# Patient Record
Sex: FEMALE | Race: WHITE | NOT HISPANIC OR LATINO | Employment: STUDENT | URBAN - METROPOLITAN AREA
[De-identification: names, ages, dates, MRNs, and addresses within clinical notes are randomized per-mention and may not be internally consistent; named-entity substitution may affect disease eponyms.]

---
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
## 2023-01-27 ENCOUNTER — OFFICE VISIT (OUTPATIENT)
Dept: INTERNAL MEDICINE CLINIC | Facility: CLINIC | Age: 18
End: 2023-01-27

## 2023-01-27 VITALS
SYSTOLIC BLOOD PRESSURE: 110 MMHG | BODY MASS INDEX: 20.59 KG/M2 | HEIGHT: 65 IN | DIASTOLIC BLOOD PRESSURE: 72 MMHG | WEIGHT: 123.6 LBS | OXYGEN SATURATION: 99 % | HEART RATE: 70 BPM | TEMPERATURE: 97.8 F

## 2023-01-27 DIAGNOSIS — F90.9 ADULT ADHD: ICD-10-CM

## 2023-01-27 DIAGNOSIS — F33.1 MODERATE EPISODE OF RECURRENT MAJOR DEPRESSIVE DISORDER (HCC): ICD-10-CM

## 2023-01-27 DIAGNOSIS — F41.1 GAD (GENERALIZED ANXIETY DISORDER): ICD-10-CM

## 2023-01-27 DIAGNOSIS — Z13.220 ENCOUNTER FOR SCREENING FOR LIPOID DISORDERS: ICD-10-CM

## 2023-01-27 DIAGNOSIS — Z00.00 ANNUAL PHYSICAL EXAM: Primary | ICD-10-CM

## 2023-01-27 RX ORDER — DEXTROAMPHETAMINE SACCHARATE, AMPHETAMINE ASPARTATE MONOHYDRATE, DEXTROAMPHETAMINE SULFATE AND AMPHETAMINE SULFATE 1.25; 1.25; 1.25; 1.25 MG/1; MG/1; MG/1; MG/1
5 CAPSULE, EXTENDED RELEASE ORAL EVERY MORNING
Qty: 30 CAPSULE | Refills: 0 | Status: SHIPPED | OUTPATIENT
Start: 2023-01-27

## 2023-01-27 NOTE — ASSESSMENT & PLAN NOTE
• Lifestyle modification, diet and exercise discussed  • Medications discussed and refilled appropriately  • Labs discussed and ordered   • Hepatitis C screening discussed  • HIV screening discussed  • Depression screening performed  • Anxiety screening performed  • BMI discussed  • Cervical cancer screening discussed and ordered

## 2023-01-27 NOTE — PATIENT INSTRUCTIONS
Problem List Items Addressed This Visit          Other    ESTRELLITA (generalized anxiety disorder)    Moderate episode of recurrent major depressive disorder ([facility] [address] )    Annual physical exam - Primary     Lifestyle modification, diet and exercise discussed  Medications discussed and refilled appropriately  Labs discussed and ordered   Hepatitis C screening discussed  HIV screening discussed  Depression screening performed  Anxiety screening performed  BMI discussed  Cervical cancer screening discussed and ordered          Relevant Orders    CBC and differential    Comprehensive metabolic panel    Encounter for screening for lipoid disorders    Relevant Orders    Lipid panel    Adult ADHD     Patient has Based upon the Diagnostic and Statistical Manual of Mental Health Disorders (DSM-5 guide for mental health disorders)    Patient has positive findings as listed on her Adult ADHD-RS-IV with Adult Prompts Document  How to take this test:    Choose from: no/none; mild; moderate; severe  [place] the choice that applies  Once you have taken the test, resubmit the answers to us and we will enter them into your chart  1  He/She has had ________ findings listed under the carelessness topic:  Is making increased mistakes, rushing through work, not checking work, messy workspace  [place]: no/none; mild; moderate; severe  2  He/She has had _________ findings listed under the difficulty sustaining attention in activities topics:    Where he/she has trouble paying attention, able to stay focused on his/her work, takes longer to complete his/her tasks as normal, and has trouble remembering what he/she has read  [place]: no/none; mild; moderate; severe     3  He/She has had _________ findings listed under the does not listen topics:   Others have complained that he/she does not seem to listen or respond when spoken to, he/she needs people to repeat directions, and has noticed that he/she misses key parts of conversations  [place]: no/none; mild; moderate; severe  4  He/She has ________ findings listed under the no follow-through topic:    He/She has trouble finishing things such as work and chores, he/she does leave things half done or finished, he/she has trouble following instructions which are complex and multi steps, and he/she needs to frequently write things down or he/she will forget them  [place]: no/none; mild; moderate; severe  5  He/She has _________ findings listed under the can not organized topic:    He/She has trouble organizing tasks and prioritizing work and chores, he/she does require assistance of others to help plan for him/her, he/she does have trouble with time management, and he/she does procrastinate  [place]: no/none; mild; moderate; severe  6  He/She has _________ findings listed under the avoid/dislikes task requiring sustained mental effort topic:    He/She avoids tasks that are difficult or lengthy, he/she has to force himself/herself to finish these tasks, he/she finds it extremely hard, and he/she continues to procrastinate  [place]: no/none; mild; moderate; severe  7  He/She has __________ findings listed under the lose important items topic:  He/She does frequently lose things, and is always looking for important items  He/She does seem to get in trouble for this  He/She also seems to put items in a safe place that he/she is unable to find later  [place]: no/none; mild; moderate; severe  8  He/She has _______ findings listed under the easily distractible topic:  He/She is easily distracted by other conversations, television, radio  He/She does have to remain in isolation of some sort in order to get any work done  Finds it difficult to get back on track once he/she is off track, and will find other things to do rather than his/her chores or work  [place]: no/none; mild; moderate; severe  9   He/She has _______ findings listed under the forgetful in daily activities topic: He/She forgets his/her daily routine items, and forgets to bring things to and from work on a daily basis  He/She does have to make numerous notes  [place]: no/none; mild; moderate; severe  10  He/She has _________ findings listed under the score was and fidgets topic:  He/She is unable to sit still, and always is fidgeting in his/her chair  He/She does tap and move his/her feet or pen or pencil  He/She does always seem to be fussing either with his/her hair or clothing and he/she states that he/she cannot stop moving it seems automatic  [place]: no/none; mild; moderate; severe  11  He/She has _________ findings listed under the can not stay seated topic:  He/She does have trouble sitting in 1 section/seat for any length of time  He/She states that he/she would rather be up and standing or able to walk rather than sitting  He/She does have to force himself/herself to stay seated  He/She does usually stay away from any requirements where he/she will need to be seated for long periods of time  [place]: no/none; mild; moderate; severe  12  He/She has _________ findings listed under the runs/climbs excessively topic:  He/She was feels physically restless, and does seem more agitated when he/she has made to sit still  [place]: no/none; mild; moderate; severe  13  He/She has severe findings listed under the can not play/work quietly topic:  He/She is unable to sit and read a book, needs to be doing some type of physical activity  [place]: no/none; mild; moderate; severe  14  He/She has _______ findings listed under the on the go, driven by a motor topic:  He/She feels hard to slow down, and that he/she always has a lot of energy and is always on the go  He/She is unable to relax  [place]: no/none; mild; moderate; severe  13  He/She has _______ findings listed under the talks excessively topic:  He/She talks a lot, all the time, definitely more than other people    He/She also finds herself to be much louder than other people  [place]: no/none; mild; moderate; severe  12  He/She has _________ findings listed under the blurts out answers topic:  He/She states that he/she does blurred out answers, is always having a hard time finding the rationale to wait his/her turn  [place]: no/none; mild; moderate; severe  16  He/She has __________ findings listed under the can not wait for turn topic:  Again he/she finds it hard to weight his/her turn, he/she feels frustrated when there are delays or when he/she has to wait in lines  [place]: no/none; mild; moderate; severe  25  He/She has ___________ findings listed under the intrudes/interrupts others topic:  He/She does butt into other people's conversations and seems to interrupt others  [place]: no/none; mild; moderate; severe  This patient was educated on side effects, risks of taking this type of medication which include abuse, misuse, dependence, addiction and tolerance  All questions were answered including different dosing levels, increasing and decreasing of of this medication  We discussed their continued open discussions with the PCP in order to make sure that they are on the correct dose  We reviewed the potential side effects of the medications including, but are not limited to, constipation, diarrhea, nausea/upset stomach, drowsiness, fatigue, dizziness, urinary retention, visual changes, insomnia, headaches, nightmares, slowed breathing while sleeping, UTI issues, impaired judgment, addiction issues and the risk of fatal overdose if not taken as prescribed  We discussed the importance of notifying the office/provider immediately of any side effects  We discussed the importance of immediate notification if there are any feelings of suicidality thoughts or behaviors   We discussed the importance of contacting the office if he has any tachycardia or fainting situations       The patient was warned against driving while taking sedation medications  We discussed that sharing medications is a felony  We discussed other side effects such as QT prolongation, risks of Myocardial Infarction (heart attack), stroke and sudden death  We discussed immediate notification if there is any seizure-like activity  We discussed issues with angioedema as an anaphylactic reactions  I have personally reviewed the [address] [place] [address] ([facility]) website prior to prescribing this medication  The patient understands and agrees to use these medications only as prescribed  At this point in time, the patient is showing no signs of addiction, abuse, diversion or suicidal ideation  All the patient's questions were answered to their satisfaction  [place] Prescription Drug Monitoring Program report was reviewed and was appropriate   All the patient's questions were answered to his/her satisfaction  Answers:  1  Mild  2  Severe  3  Mild  4  Moderate   5  Mild  6  Severe  7  Severe  8  Severe   9  Severe   10  Severe  11  Mild  12  Mild  13  No  14  No  15  No  16  Mild  17  Mild  18   Mild     1/27/2023  Will start her on adderall 5mg and monitor

## 2023-01-27 NOTE — ASSESSMENT & PLAN NOTE
Patient has Based upon the Diagnostic and Statistical Manual of Mental Health Disorders (DSM-5 guide for mental health disorders)    Patient has positive findings as listed on her Adult ADHD-RS-IV with Adult Prompts Document  How to take this test:    Choose from: no/none; mild; moderate; severe  [place] the choice that applies  Once you have taken the test, resubmit the answers to us and we will enter them into your chart  1  He/She has had ________ findings listed under the carelessness topic:  · Is making increased mistakes, rushing through work, not checking work, messy workspace  · [place]: no/none; mild; moderate; severe  2  He/She has had _________ findings listed under the difficulty sustaining attention in activities topics:    · Where he/she has trouble paying attention, able to stay focused on his/her work, takes longer to complete his/her tasks as normal, and has trouble remembering what he/she has read  · [place]: no/none; mild; moderate; severe  3  He/She has had _________ findings listed under the does not listen topics:   · Others have complained that he/she does not seem to listen or respond when spoken to, he/she needs people to repeat directions, and has noticed that he/she misses key parts of conversations  · [place]: no/none; mild; moderate; severe  4  He/She has ________ findings listed under the no follow-through topic:    · He/She has trouble finishing things such as work and chores, he/she does leave things half done or finished, he/she has trouble following instructions which are complex and multi steps, and he/she needs to frequently write things down or he/she will forget them  · [place]: no/none; mild; moderate; severe     5  He/She has _________ findings listed under the can not organized topic:    · He/She has trouble organizing tasks and prioritizing work and chores, he/she does require assistance of others to help plan for him/her, he/she does have trouble with time management, and he/she does procrastinate  · [place]: no/none; mild; moderate; severe  6  He/She has _________ findings listed under the avoid/dislikes task requiring sustained mental effort topic:    · He/She avoids tasks that are difficult or lengthy, he/she has to force himself/herself to finish these tasks, he/she finds it extremely hard, and he/she continues to procrastinate  · [place]: no/none; mild; moderate; severe  7  He/She has __________ findings listed under the lose important items topic:  · He/She does frequently lose things, and is always looking for important items  He/She does seem to get in trouble for this  He/She also seems to put items in a safe place that he/she is unable to find later  · [place]: no/none; mild; moderate; severe  8  He/She has _______ findings listed under the easily distractible topic:  · He/She is easily distracted by other conversations, television, radio  He/She does have to remain in isolation of some sort in order to get any work done  Finds it difficult to get back on track once he/she is off track, and will find other things to do rather than his/her chores or work  · [place]: no/none; mild; moderate; severe  9  He/She has _______ findings listed under the forgetful in daily activities topic:  · He/She forgets his/her daily routine items, and forgets to bring things to and from work on a daily basis  He/She does have to make numerous notes  · [place]: no/none; mild; moderate; severe  10  He/She has _________ findings listed under the score was and fidgets topic:  · He/She is unable to sit still, and always is fidgeting in his/her chair  He/She does tap and move his/her feet or pen or pencil  He/She does always seem to be fussing either with his/her hair or clothing and he/she states that he/she cannot stop moving it seems automatic  · [place]: no/none; mild; moderate; severe     11  He/She has _________ findings listed under the can not stay seated topic:  · He/She does have trouble sitting in 1 section/seat for any length of time  He/She states that he/she would rather be up and standing or able to walk rather than sitting  He/She does have to force himself/herself to stay seated  He/She does usually stay away from any requirements where he/she will need to be seated for long periods of time  · [place]: no/none; mild; moderate; severe  12  He/She has _________ findings listed under the runs/climbs excessively topic:  · He/She was feels physically restless, and does seem more agitated when he/she has made to sit still  · [place]: no/none; mild; moderate; severe  13  He/She has severe findings listed under the can not play/work quietly topic:  · He/She is unable to sit and read a book, needs to be doing some type of physical activity  · [place]: no/none; mild; moderate; severe  14  He/She has _______ findings listed under the on the go, driven by a motor topic:  · He/She feels hard to slow down, and that he/she always has a lot of energy and is always on the go  He/She is unable to relax  · [place]: no/none; mild; moderate; severe  13  He/She has _______ findings listed under the talks excessively topic:  · He/She talks a lot, all the time, definitely more than other people  He/She also finds herself to be much louder than other people  · [place]: no/none; mild; moderate; severe  12  He/She has _________ findings listed under the blurts out answers topic:  · He/She states that he/she does blurred out answers, is always having a hard time finding the rationale to wait his/her turn  · [place]: no/none; mild; moderate; severe  16  He/She has __________ findings listed under the can not wait for turn topic:  · Again he/she finds it hard to weight his/her turn, he/she feels frustrated when there are delays or when he/she has to wait in lines  · [place]: no/none; mild; moderate; severe     18  He/She has ___________ findings listed under the intrudes/interrupts others topic:  · He/She does butt into other people's conversations and seems to interrupt others  · [place]: no/none; mild; moderate; severe  • This patient was educated on side effects, risks of taking this type of medication which include abuse, misuse, dependence, addiction and tolerance  • All questions were answered including different dosing levels, increasing and decreasing of of this medication  • We discussed their continued open discussions with the PCP in order to make sure that they are on the correct dose  • We reviewed the potential side effects of the medications including, but are not limited to, constipation, diarrhea, nausea/upset stomach, drowsiness, fatigue, dizziness, urinary retention, visual changes, insomnia, headaches, nightmares, slowed breathing while sleeping, UTI issues, impaired judgment, addiction issues and the risk of fatal overdose if not taken as prescribed  • We discussed the importance of notifying the office/provider immediately of any side effects  • We discussed the importance of immediate notification if there are any feelings of suicidality thoughts or behaviors   • We discussed the importance of contacting the office if he has any tachycardia or fainting situations  • The patient was warned against driving while taking sedation medications  • We discussed that sharing medications is a felony  • We discussed other side effects such as QT prolongation, risks of Myocardial Infarction (heart attack), stroke and sudden death  • We discussed immediate notification if there is any seizure-like activity  • We discussed issues with angioedema as an anaphylactic reactions  • [facility] website prior to prescribing this medication  • The patient understands and agrees to use these medications only as prescribed   At this point in time, the patient is showing no signs of addiction, abuse, diversion or suicidal ideation  • All the patient's questions were answered to their satisfaction  • [facility] Prescription Drug Monitoring Program report was reviewed and was appropriate   • All the patient's questions were answered to his/her satisfaction  Answers:  1  Mild  2  Severe  3  Mild  4  Moderate   5  Mild  6  Severe  7  Severe  8  Severe   9  Severe   10  Severe  11  Mild  12  Mild  13  No  14  No  15  No  16  Mild  17  Mild  18   Mild     · 1/27/2023  · Will start her on adderall 5mg and monitor

## 2023-01-27 NOTE — PROGRESS NOTES
[id number]    NAME: Ignacia Claros  AGE: 19 y o  SEX: female  : 2005     DATE: 2023     Assessment and Plan:     Problem List Items Addressed This Visit        Other    ESTRELLITA (generalized anxiety disorder)    Moderate episode of recurrent major depressive disorder ([facility] [address] )    Annual physical exam - Primary     • Lifestyle modification, diet and exercise discussed  • Medications discussed and refilled appropriately  • Labs discussed and ordered   • Hepatitis C screening discussed  • HIV screening discussed  • Depression screening performed  • Anxiety screening performed  • BMI discussed  • Cervical cancer screening discussed and ordered          Relevant Orders    CBC and differential    Comprehensive metabolic panel    Encounter for screening for lipoid disorders    Relevant Orders    Lipid panel    Adult ADHD     Patient has Based upon the Diagnostic and Statistical Manual of Mental Health Disorders (DSM-5 guide for mental health disorders)    Patient has positive findings as listed on her Adult ADHD-RS-IV with Adult Prompts Document  How to take this test:    Choose from: no/none; mild; moderate; severe  [place] the choice that applies  Once you have taken the test, resubmit the answers to us and we will enter them into your chart  1  He/She has had ________ findings listed under the carelessness topic:  · Is making increased mistakes, rushing through work, not checking work, messy workspace  · [place]: no/none; mild; moderate; severe  2  He/She has had _________ findings listed under the difficulty sustaining attention in activities topics:    · Where he/she has trouble paying attention, able to stay focused on his/her work, takes longer to complete his/her tasks as normal, and has trouble remembering what he/she has read  · [place]: no/none; mild; moderate; severe     3  He/She has had _________ findings listed under the does not listen topics:   · Others have complained that he/she does not seem to listen or respond when spoken to, he/she needs people to repeat directions, and has noticed that he/she misses key parts of conversations  · [place]: no/none; mild; moderate; severe  4  He/She has ________ findings listed under the no follow-through topic:    · He/She has trouble finishing things such as work and chores, he/she does leave things half done or finished, he/she has trouble following instructions which are complex and multi steps, and he/she needs to frequently write things down or he/she will forget them  · [place]: no/none; mild; moderate; severe  5  He/She has _________ findings listed under the can not organized topic:    · He/She has trouble organizing tasks and prioritizing work and chores, he/she does require assistance of others to help plan for him/her, he/she does have trouble with time management, and he/she does procrastinate  · [place]: no/none; mild; moderate; severe  6  He/She has _________ findings listed under the avoid/dislikes task requiring sustained mental effort topic:    · He/She avoids tasks that are difficult or lengthy, he/she has to force himself/herself to finish these tasks, he/she finds it extremely hard, and he/she continues to procrastinate  · [place]: no/none; mild; moderate; severe  7  He/She has __________ findings listed under the lose important items topic:  · He/She does frequently lose things, and is always looking for important items  He/She does seem to get in trouble for this  He/She also seems to put items in a safe place that he/she is unable to find later  · [place]: no/none; mild; moderate; severe  8  He/She has _______ findings listed under the easily distractible topic:  · He/She is easily distracted by other conversations, television, radio  He/She does have to remain in isolation of some sort in order to get any work done    Finds it difficult to get back on track once he/she is off track, and will find other things to do rather than his/her chores or work  · [place]: no/none; mild; moderate; severe  9  He/She has _______ findings listed under the forgetful in daily activities topic:  · He/She forgets his/her daily routine items, and forgets to bring things to and from work on a daily basis  He/She does have to make numerous notes  · [place]: no/none; mild; moderate; severe  10  He/She has _________ findings listed under the score was and fidgets topic:  · He/She is unable to sit still, and always is fidgeting in his/her chair  He/She does tap and move his/her feet or pen or pencil  He/She does always seem to be fussing either with his/her hair or clothing and he/she states that he/she cannot stop moving it seems automatic  · [place]: no/none; mild; moderate; severe  11  He/She has _________ findings listed under the can not stay seated topic:  · He/She does have trouble sitting in 1 section/seat for any length of time  He/She states that he/she would rather be up and standing or able to walk rather than sitting  He/She does have to force himself/herself to stay seated  He/She does usually stay away from any requirements where he/she will need to be seated for long periods of time  · [place]: no/none; mild; moderate; severe  12  He/She has _________ findings listed under the runs/climbs excessively topic:  · He/She was feels physically restless, and does seem more agitated when he/she has made to sit still  · [place]: no/none; mild; moderate; severe  13  He/She has severe findings listed under the can not play/work quietly topic:  · He/She is unable to sit and read a book, needs to be doing some type of physical activity  · [place]: no/none; mild; moderate; severe     14  He/She has _______ findings listed under the on the go, driven by a motor topic:  · He/She feels hard to slow down, and that he/she always has a lot of energy and is always on the go  He/She is unable to relax  · [place]: no/none; mild; moderate; severe  13  He/She has _______ findings listed under the talks excessively topic:  · He/She talks a lot, all the time, definitely more than other people  He/She also finds herself to be much louder than other people  · [place]: no/none; mild; moderate; severe  12  He/She has _________ findings listed under the blurts out answers topic:  · He/She states that he/she does blurred out answers, is always having a hard time finding the rationale to wait his/her turn  · [place]: no/none; mild; moderate; severe  16  He/She has __________ findings listed under the can not wait for turn topic:  · Again he/she finds it hard to weight his/her turn, he/she feels frustrated when there are delays or when he/she has to wait in lines  · [place]: no/none; mild; moderate; severe  25  He/She has ___________ findings listed under the intrudes/interrupts others topic:  · He/She does butt into other people's conversations and seems to interrupt others  · [place]: no/none; mild; moderate; severe  • This patient was educated on side effects, risks of taking this type of medication which include abuse, misuse, dependence, addiction and tolerance  • All questions were answered including different dosing levels, increasing and decreasing of of this medication  • We discussed their continued open discussions with the PCP in order to make sure that they are on the correct dose  • We reviewed the potential side effects of the medications including, but are not limited to, constipation, diarrhea, nausea/upset stomach, drowsiness, fatigue, dizziness, urinary retention, visual changes, insomnia, headaches, nightmares, slowed breathing while sleeping, UTI issues, impaired judgment, addiction issues and the risk of fatal overdose if not taken as prescribed  • We discussed the importance of notifying the office/provider immediately of any side effects  • We discussed the importance of immediate notification if there are any feelings of suicidality thoughts or behaviors   • We discussed the importance of contacting the office if he has any tachycardia or fainting situations  • The patient was warned against driving while taking sedation medications  • We discussed that sharing medications is a felony  • We discussed other side effects such as QT prolongation, risks of Myocardial Infarction (heart attack), stroke and sudden death  • We discussed immediate notification if there is any seizure-like activity  • We discussed issues with angioedema as an anaphylactic reactions  • I have personally reviewed the [facility] [place] [address] ([facility]) website prior to prescribing this medication  • The patient understands and agrees to use these medications only as prescribed  At this point in time, the patient is showing no signs of addiction, abuse, diversion or suicidal ideation  • All the patient's questions were answered to their satisfaction  • [facility] Prescription Drug Monitoring Program report was reviewed and was appropriate   • All the patient's questions were answered to his/her satisfaction  Answers:  1  Mild  2  Severe  3  Mild  4  Moderate   5  Mild  6  Severe  7  Severe  8  Severe   9  Severe   10  Severe  11  Mild  12  Mild  13  No  14  No  15  No  16  Mild  17  Mild  18  Mild     · 1/27/2023  · Will start her on adderall 5mg and monitor               Immunizations and preventive care screenings were discussed with patient today  Appropriate education was printed on patient's after visit summary  Counseling:  Alcohol/drug use: discussed moderation in alcohol intake, the recommendations for healthy alcohol use, and avoidance of illicit drug use  Dental Health: discussed importance of regular tooth brushing, flossing, and dental visits    Injury prevention: discussed safety/seat belts, safety helmets, smoke detectors, carbon dioxide detectors, and smoking near bedding or upholstery  Sexual health: discussed sexually transmitted diseases, partner selection, use of condoms, avoidance of unintended pregnancy, and contraceptive alternatives  · Exercise: the importance of regular exercise/physical activity was discussed  Recommend exercise 3-5 times per week for at least 30 minutes  Return in about 4 weeks (around 2023) for Recheck virtual       Chief Complaint:     Chief Complaint   Patient presents with   • Annual Exam     New pt, c/o having severe anxiety and depression  RTN labs, DS         History of Present Illness:     Adult Annual Physical   Patient here for a comprehensive physical exam  The patient reports problems - as discussed   Diet and Physical Activity  · Diet/Nutrition: well balanced diet  · Exercise: 3-4 times a week on average  Depression Screening  PHQ-2/9 Depression Screening    Little interest or pleasure in doing things: 1 - several days  Feeling down, depressed, or hopeless: 1 - several days  Trouble falling or staying asleep, or sleeping too much: 1 - several days  Feeling tired or having little energy: 2 - more than half the days  Poor appetite or overeatin - several days  Feeling bad about yourself - or that you are a failure or have let yourself or your family down: 0 - not at all  Trouble concentrating on things, such as reading the newspaper or watching television: 1 - several days  Moving or speaking so slowly that other people could have noticed  Or the opposite - being so fidgety or restless that you have been moving around a lot more than usual: 2 - more than half the days  Thoughts that you would be better off dead, or of hurting yourself in some way: 0 - not at all  PHQ-9 Score: 9   PHQ-9 Interpretation: Mild depression        General Health  · Sleep: sleeps well  · Hearing: normal - bilateral   · Vision: no vision problems  · Dental: regular dental visits  /GYN Health  · Last menstrual period:   · Contraceptive method:      · History of STDs?: no      Review of Systems:     Review of Systems   Constitutional: Negative for activity change, chills, fatigue and fever  HENT: Negative for rhinorrhea and sore throat  Eyes: Negative for pain  Respiratory: Negative for cough and shortness of breath  Cardiovascular: Negative for chest pain, palpitations and leg swelling  Gastrointestinal: Negative for abdominal pain, constipation, diarrhea, nausea and vomiting  Genitourinary: Negative for difficulty urinating, flank pain, frequency and urgency  Musculoskeletal: Negative for gait problem, joint swelling and myalgias  Skin: Negative for color change  Neurological: Negative for dizziness, weakness, light-headedness and headaches  Psychiatric/Behavioral: Positive for decreased concentration  Negative for sleep disturbance  The patient is nervous/anxious  All other systems reviewed and are negative       Past Medical History:     Past Medical History:   Diagnosis Date   • Anxiety    • Depression       Past Surgical History:     Past Surgical History:   Procedure Laterality Date   • MOUTH SURGERY        Social History:     Social History     Socioeconomic History   • Marital status: Single     Spouse name: None   • Number of children: None   • Years of education: None   • Highest education level: None   Occupational History   • Occupation: student   Tobacco Use   • Smoking status: Never   • Smokeless tobacco: Never   Vaping Use   • Vaping Use: Never used   Substance and Sexual Activity   • Alcohol use: Never   • Drug use: Never   • Sexual activity: None   Other Topics Concern   • None   Social History Narrative   • None     Social Determinants of Health     Financial Resource Strain: Not on file   Food Insecurity: Not on file   Transportation Needs: Not on file   Physical Activity: Not on file   Stress: Not on file   Social Connections: Not on file Intimate Partner Violence: Not on file   Housing Stability: Not on file      Family History:     Family History   Problem Relation Age of Onset   • No Known Problems Mother    • No Known Problems Father       Current Medications:     No current outpatient medications on file  No current facility-administered medications for this visit  Allergies:     No Known Allergies   Physical Exam:     /72 (BP Location: Left arm, Patient Position: Sitting, Cuff Size: Standard)   Pulse 70   Temp 97 8 °F (36 6 °C)   Ht 5' 4 5" (1 638 m)   Wt 56 1 kg (123 lb 9 6 oz)   SpO2 99%   BMI 20 89 kg/m²     Physical Exam  Vitals and nursing note reviewed  Constitutional:       General: She is awake  She is not in acute distress  Appearance: Normal appearance  She is well-developed and overweight  HENT:      Head: Normocephalic and atraumatic  Nose: Nose normal       Mouth/Throat:      Mouth: Mucous membranes are moist    Eyes:      Conjunctiva/sclera: Conjunctivae normal    Cardiovascular:      Rate and Rhythm: Normal rate and regular rhythm  Pulses: Normal pulses  Heart sounds: Normal heart sounds  No murmur heard  Pulmonary:      Effort: Pulmonary effort is normal  No respiratory distress  Breath sounds: Normal breath sounds  Abdominal:      General: Bowel sounds are normal       Palpations: Abdomen is soft  Tenderness: There is no abdominal tenderness  Musculoskeletal:      Cervical back: Neck supple  Right lower leg: No edema  Left lower leg: No edema  Skin:     General: Skin is warm and dry  Neurological:      Mental Status: She is alert and oriented to person, place, and time  Psychiatric:         Attention and Perception: Attention normal          Mood and Affect: Mood is anxious and depressed  Speech: Speech normal          Behavior: Behavior normal  Behavior is cooperative        Comments: Decreased concentration           RITESH Leiva [id number]

## 2023-02-27 ENCOUNTER — TELEMEDICINE (OUTPATIENT)
Dept: INTERNAL MEDICINE CLINIC | Facility: CLINIC | Age: 18
End: 2023-02-27

## 2023-02-27 DIAGNOSIS — F90.9 ADULT ADHD: Primary | ICD-10-CM

## 2023-02-27 RX ORDER — DEXTROAMPHETAMINE SACCHARATE, AMPHETAMINE ASPARTATE MONOHYDRATE, DEXTROAMPHETAMINE SULFATE AND AMPHETAMINE SULFATE 2.5; 2.5; 2.5; 2.5 MG/1; MG/1; MG/1; MG/1
10 CAPSULE, EXTENDED RELEASE ORAL EVERY MORNING
Qty: 30 CAPSULE | Refills: 0 | Status: SHIPPED | OUTPATIENT
Start: 2023-02-27

## 2023-02-27 NOTE — PATIENT INSTRUCTIONS
Problem List Items Addressed This Visit          Other    Adult ADHD - Primary     1/27/2023  Will start her on adderall 5mg and monitor     2/27/2023  Patient started out doing well on adderall 5mg  She noticed about a week after taking the medication, that she was not able to focus as well as she had been  Will increase her dose to 10mg and monitor her     She also states that her anxiety has improved         Relevant Medications    amphetamine-dextroamphetamine (ADDERALL XR, 10MG,) 10 MG 24 hr capsule

## 2023-02-27 NOTE — PROGRESS NOTES
Virtual Regular Visit    Verification of patient location:    Patient is located in the following state in which I hold an active license [place]      Assessment/Plan:    Problem List Items Addressed This Visit        Other    Adult ADHD - Primary     • 1/27/2023  • Will start her on adderall 5mg and monitor     · 2/27/2023  · Patient started out doing well on adderall 5mg  · She noticed about a week after taking the medication, that she was not able to focus as well as she had been  · Will increase her dose to 10mg and monitor her  · She also states that her anxiety has improved         Relevant Medications    amphetamine-dextroamphetamine (ADDERALL XR, 10MG,) 10 MG 24 hr capsule            Reason for visit is   Chief Complaint   Patient presents with   • Follow-up     4 weeks f/u  on anxiety, pt has been on her meds and states she can tell the different, thinks an increase in the mg would be better         Encounter provider RITESH Tai    Provider located at [address] [id number]      Recent Visits  No visits were found meeting these conditions  Showing recent visits within past 7 days and meeting all other requirements  Today's Visits  Date Type Provider Dept   02/27/23 Telemedicine RITESH Tai  [place] today's visits and meeting all other requirements  Future Appointments  No visits were found meeting these conditions  Showing future appointments within next 150 days and meeting all other requirements       The patient was identified by name and date of birth  Abena Orantes was informed that this is a telemedicine visit and that the visit is being conducted through the [address] [place] [address] Now platform  She agrees to proceed     My office door was closed  No one else was in the room  She acknowledged consent and understanding of privacy and security of the video platform   The patient has agreed to participate and understands they can discontinue the visit at any time  Patient is aware this is a billable service  Subjective  Mary Sarkar is a 19 y o  female   The patient is here today to discuss her ADHD and her medications  Please continue to the [facility] section of the note for details of today's visit  Past Medical History:   Diagnosis Date   • Anxiety    • Depression        Past Surgical History:   Procedure Laterality Date   • MOUTH SURGERY         Current Outpatient Medications   Medication Sig Dispense Refill   • amphetamine-dextroamphetamine (ADDERALL XR, 10MG,) 10 MG 24 hr capsule Take 1 capsule (10 mg total) by mouth every morning Max Daily Amount: 10 mg 30 capsule 0   • amphetamine-dextroamphetamine (ADDERALL XR, 5MG,) 5 MG 24 hr capsule Take 1 capsule (5 mg total) by mouth every morning Max Daily Amount: 5 mg 30 capsule 0     No current facility-administered medications for this visit  No Known Allergies    Review of Systems   Constitutional: Negative for activity change, appetite change, chills, fatigue and fever  HENT: Negative for congestion, ear pain, postnasal drip, rhinorrhea, sinus pressure, sinus pain, sore throat and trouble swallowing  Eyes: Negative for pain  Respiratory: Negative for cough, chest tightness and shortness of breath  Cardiovascular: Negative for chest pain, palpitations and leg swelling  Gastrointestinal: Negative for abdominal pain, constipation, diarrhea, nausea and vomiting  Genitourinary: Negative for difficulty urinating, flank pain, frequency and urgency  Musculoskeletal: Negative for back pain, joint swelling and myalgias  Skin: Negative for color change  Neurological: Negative for dizziness, weakness, light-headedness and headaches  Psychiatric/Behavioral: Positive for decreased concentration  Negative for sleep disturbance  The patient is nervous/anxious          Video Exam    Vitals:       Physical Exam  Constitutional: General: She is awake  Appearance: Normal appearance  She is well-developed  HENT:      Head: Normocephalic  Nose: Nose normal       Mouth/Throat:      Mouth: Mucous membranes are moist    Eyes:      Extraocular Movements: Extraocular movements intact  Cardiovascular:      Rate and Rhythm: Normal rate  Pulmonary:      Effort: Pulmonary effort is normal    Abdominal:      Palpations: Abdomen is soft  Musculoskeletal:         General: Normal range of motion  Cervical back: Normal range of motion  Skin:     General: Skin is warm and dry  Neurological:      Mental Status: She is alert and oriented to person, place, and time  Psychiatric:         Attention and Perception: Attention normal          Mood and Affect: Mood is anxious  Speech: Speech normal          Behavior: Behavior normal  Behavior is cooperative  Comments: Improved concentration           I spent 15 minutes with patient today in which greater than 50% of the time was spent in counseling/coordination of care regarding medications, treatment plan, and follow up care

## 2023-02-27 NOTE — ASSESSMENT & PLAN NOTE
• 1/27/2023  • Will start her on adderall 5mg and monitor     · 2/27/2023  · Patient started out doing well on adderall 5mg  · She noticed about a week after taking the medication, that she was not able to focus as well as she had been  · Will increase her dose to 10mg and monitor her     · She also states that her anxiety has improved

## 2023-03-28 PROBLEM — Z13.220 ENCOUNTER FOR SCREENING FOR LIPOID DISORDERS: Status: RESOLVED | Noted: 2023-01-27 | Resolved: 2023-03-28

## 2023-03-30 DIAGNOSIS — F90.9 ADULT ADHD: ICD-10-CM

## 2023-03-31 RX ORDER — DEXTROAMPHETAMINE SACCHARATE, AMPHETAMINE ASPARTATE MONOHYDRATE, DEXTROAMPHETAMINE SULFATE AND AMPHETAMINE SULFATE 2.5; 2.5; 2.5; 2.5 MG/1; MG/1; MG/1; MG/1
CAPSULE, EXTENDED RELEASE ORAL
Qty: 30 CAPSULE | Refills: 0 | Status: SHIPPED | OUTPATIENT
Start: 2023-03-31

## 2023-05-10 DIAGNOSIS — F90.9 ADULT ADHD: ICD-10-CM

## 2023-05-10 RX ORDER — DEXTROAMPHETAMINE SACCHARATE, AMPHETAMINE ASPARTATE MONOHYDRATE, DEXTROAMPHETAMINE SULFATE AND AMPHETAMINE SULFATE 2.5; 2.5; 2.5; 2.5 MG/1; MG/1; MG/1; MG/1
CAPSULE, EXTENDED RELEASE ORAL
Qty: 30 CAPSULE | Refills: 0 | Status: SHIPPED | OUTPATIENT
Start: 2023-05-10

## 2023-08-17 DIAGNOSIS — F90.9 ADULT ADHD: ICD-10-CM

## 2023-08-17 RX ORDER — DEXTROAMPHETAMINE SACCHARATE, AMPHETAMINE ASPARTATE MONOHYDRATE, DEXTROAMPHETAMINE SULFATE AND AMPHETAMINE SULFATE 2.5; 2.5; 2.5; 2.5 MG/1; MG/1; MG/1; MG/1
10 CAPSULE, EXTENDED RELEASE ORAL EVERY MORNING
Qty: 30 CAPSULE | Refills: 0 | OUTPATIENT
Start: 2023-08-17

## 2023-08-17 RX ORDER — DEXTROAMPHETAMINE SACCHARATE, AMPHETAMINE ASPARTATE MONOHYDRATE, DEXTROAMPHETAMINE SULFATE AND AMPHETAMINE SULFATE 2.5; 2.5; 2.5; 2.5 MG/1; MG/1; MG/1; MG/1
CAPSULE, EXTENDED RELEASE ORAL
Qty: 30 CAPSULE | Refills: 0 | Status: SHIPPED | OUTPATIENT
Start: 2023-08-17

## 2023-08-17 NOTE — TELEPHONE ENCOUNTER
Requested medication(s) are due for refill today: No  Patient has already received a courtesy refill: Yes  Other reason request has been forwarded to provider: Sloane Adams

## 2023-09-20 DIAGNOSIS — F90.9 ADULT ADHD: ICD-10-CM

## 2023-09-21 RX ORDER — DEXTROAMPHETAMINE SACCHARATE, AMPHETAMINE ASPARTATE MONOHYDRATE, DEXTROAMPHETAMINE SULFATE AND AMPHETAMINE SULFATE 2.5; 2.5; 2.5; 2.5 MG/1; MG/1; MG/1; MG/1
10 CAPSULE, EXTENDED RELEASE ORAL EVERY MORNING
Qty: 30 CAPSULE | Refills: 0 | Status: SHIPPED | OUTPATIENT
Start: 2023-09-21

## 2023-11-06 DIAGNOSIS — F90.9 ADULT ADHD: ICD-10-CM

## 2023-11-07 RX ORDER — DEXTROAMPHETAMINE SACCHARATE, AMPHETAMINE ASPARTATE MONOHYDRATE, DEXTROAMPHETAMINE SULFATE AND AMPHETAMINE SULFATE 2.5; 2.5; 2.5; 2.5 MG/1; MG/1; MG/1; MG/1
10 CAPSULE, EXTENDED RELEASE ORAL EVERY MORNING
Qty: 30 CAPSULE | Refills: 0 | Status: SHIPPED | OUTPATIENT
Start: 2023-11-07

## 2024-01-06 DIAGNOSIS — F90.9 ADULT ADHD: ICD-10-CM

## 2024-01-08 RX ORDER — DEXTROAMPHETAMINE SACCHARATE, AMPHETAMINE ASPARTATE MONOHYDRATE, DEXTROAMPHETAMINE SULFATE AND AMPHETAMINE SULFATE 2.5; 2.5; 2.5; 2.5 MG/1; MG/1; MG/1; MG/1
10 CAPSULE, EXTENDED RELEASE ORAL EVERY MORNING
Qty: 30 CAPSULE | Refills: 0 | Status: SHIPPED | OUTPATIENT
Start: 2024-01-08

## 2024-02-16 DIAGNOSIS — F90.9 ADULT ADHD: ICD-10-CM

## 2024-02-19 RX ORDER — DEXTROAMPHETAMINE SACCHARATE, AMPHETAMINE ASPARTATE MONOHYDRATE, DEXTROAMPHETAMINE SULFATE AND AMPHETAMINE SULFATE 2.5; 2.5; 2.5; 2.5 MG/1; MG/1; MG/1; MG/1
10 CAPSULE, EXTENDED RELEASE ORAL EVERY MORNING
Qty: 30 CAPSULE | Refills: 0 | Status: SHIPPED | OUTPATIENT
Start: 2024-02-19

## 2024-03-21 DIAGNOSIS — F90.9 ADULT ADHD: ICD-10-CM

## 2024-03-21 RX ORDER — DEXTROAMPHETAMINE SACCHARATE, AMPHETAMINE ASPARTATE MONOHYDRATE, DEXTROAMPHETAMINE SULFATE AND AMPHETAMINE SULFATE 2.5; 2.5; 2.5; 2.5 MG/1; MG/1; MG/1; MG/1
10 CAPSULE, EXTENDED RELEASE ORAL EVERY MORNING
Qty: 30 CAPSULE | Refills: 0 | Status: SHIPPED | OUTPATIENT
Start: 2024-03-21

## 2024-05-08 ENCOUNTER — LAB (OUTPATIENT)
Dept: LAB | Facility: CLINIC | Age: 19
End: 2024-05-08
Payer: COMMERCIAL

## 2024-05-08 ENCOUNTER — OFFICE VISIT (OUTPATIENT)
Dept: INTERNAL MEDICINE CLINIC | Facility: CLINIC | Age: 19
End: 2024-05-08
Payer: COMMERCIAL

## 2024-05-08 VITALS
OXYGEN SATURATION: 97 % | SYSTOLIC BLOOD PRESSURE: 112 MMHG | BODY MASS INDEX: 22.16 KG/M2 | HEART RATE: 75 BPM | WEIGHT: 133 LBS | HEIGHT: 65 IN | TEMPERATURE: 97.5 F | DIASTOLIC BLOOD PRESSURE: 68 MMHG

## 2024-05-08 DIAGNOSIS — Z13.1 SCREENING FOR DIABETES MELLITUS: ICD-10-CM

## 2024-05-08 DIAGNOSIS — Z23 ENCOUNTER FOR IMMUNIZATION: ICD-10-CM

## 2024-05-08 DIAGNOSIS — Z12.4 SCREENING FOR CERVICAL CANCER: ICD-10-CM

## 2024-05-08 DIAGNOSIS — Z51.81 ENCOUNTER FOR THERAPEUTIC DRUG LEVEL MONITORING: ICD-10-CM

## 2024-05-08 DIAGNOSIS — F90.9 ADULT ADHD: Chronic | ICD-10-CM

## 2024-05-08 DIAGNOSIS — Z00.00 ANNUAL PHYSICAL EXAM: ICD-10-CM

## 2024-05-08 DIAGNOSIS — Z13.220 SCREENING FOR HYPERLIPIDEMIA: ICD-10-CM

## 2024-05-08 DIAGNOSIS — E55.9 VITAMIN D DEFICIENCY: ICD-10-CM

## 2024-05-08 DIAGNOSIS — Z00.00 ANNUAL PHYSICAL EXAM: Primary | ICD-10-CM

## 2024-05-08 LAB
25(OH)D3 SERPL-MCNC: 25.1 NG/ML (ref 30–100)
ALBUMIN SERPL BCP-MCNC: 4.3 G/DL (ref 3.5–5)
ALP SERPL-CCNC: 87 U/L (ref 34–104)
ALT SERPL W P-5'-P-CCNC: 19 U/L (ref 7–52)
ANION GAP SERPL CALCULATED.3IONS-SCNC: 7 MMOL/L (ref 4–13)
AST SERPL W P-5'-P-CCNC: 19 U/L (ref 13–39)
BILIRUB SERPL-MCNC: 0.37 MG/DL (ref 0.2–1)
BUN SERPL-MCNC: 8 MG/DL (ref 5–25)
CALCIUM SERPL-MCNC: 8.7 MG/DL (ref 8.4–10.2)
CHLORIDE SERPL-SCNC: 104 MMOL/L (ref 96–108)
CHOLEST SERPL-MCNC: 130 MG/DL
CO2 SERPL-SCNC: 30 MMOL/L (ref 21–32)
CREAT SERPL-MCNC: 0.61 MG/DL (ref 0.6–1.3)
ERYTHROCYTE [DISTWIDTH] IN BLOOD BY AUTOMATED COUNT: 12.4 % (ref 11.6–15.1)
EST. AVERAGE GLUCOSE BLD GHB EST-MCNC: 117 MG/DL
GFR SERPL CREATININE-BSD FRML MDRD: 130 ML/MIN/1.73SQ M
GLUCOSE P FAST SERPL-MCNC: 92 MG/DL (ref 65–99)
HBA1C MFR BLD: 5.7 %
HCT VFR BLD AUTO: 42.2 % (ref 34.8–46.1)
HDLC SERPL-MCNC: 71 MG/DL
HGB BLD-MCNC: 13.2 G/DL (ref 11.5–15.4)
LDLC SERPL CALC-MCNC: 44 MG/DL (ref 0–100)
MCH RBC QN AUTO: 27.3 PG (ref 26.8–34.3)
MCHC RBC AUTO-ENTMCNC: 31.3 G/DL (ref 31.4–37.4)
MCV RBC AUTO: 87 FL (ref 82–98)
NONHDLC SERPL-MCNC: 59 MG/DL
PLATELET # BLD AUTO: 320 THOUSANDS/UL (ref 149–390)
PMV BLD AUTO: 9.3 FL (ref 8.9–12.7)
POTASSIUM SERPL-SCNC: 4.4 MMOL/L (ref 3.5–5.3)
PROT SERPL-MCNC: 6.8 G/DL (ref 6.4–8.4)
RBC # BLD AUTO: 4.83 MILLION/UL (ref 3.81–5.12)
SODIUM SERPL-SCNC: 141 MMOL/L (ref 135–147)
TRIGL SERPL-MCNC: 76 MG/DL
WBC # BLD AUTO: 5.31 THOUSAND/UL (ref 4.31–10.16)

## 2024-05-08 PROCEDURE — 80061 LIPID PANEL: CPT

## 2024-05-08 PROCEDURE — 83036 HEMOGLOBIN GLYCOSYLATED A1C: CPT

## 2024-05-08 PROCEDURE — 82306 VITAMIN D 25 HYDROXY: CPT

## 2024-05-08 PROCEDURE — 85027 COMPLETE CBC AUTOMATED: CPT

## 2024-05-08 PROCEDURE — 99395 PREV VISIT EST AGE 18-39: CPT | Performed by: NURSE PRACTITIONER

## 2024-05-08 PROCEDURE — 36415 COLL VENOUS BLD VENIPUNCTURE: CPT

## 2024-05-08 PROCEDURE — 80053 COMPREHEN METABOLIC PANEL: CPT

## 2024-05-08 RX ORDER — DEXTROAMPHETAMINE SACCHARATE, AMPHETAMINE ASPARTATE MONOHYDRATE, DEXTROAMPHETAMINE SULFATE AND AMPHETAMINE SULFATE 3.75; 3.75; 3.75; 3.75 MG/1; MG/1; MG/1; MG/1
15 CAPSULE, EXTENDED RELEASE ORAL EVERY MORNING
Qty: 30 CAPSULE | Refills: 0 | Status: SHIPPED | OUTPATIENT
Start: 2024-05-08

## 2024-05-08 RX ORDER — DEXTROAMPHETAMINE SACCHARATE, AMPHETAMINE ASPARTATE, DEXTROAMPHETAMINE SULFATE AND AMPHETAMINE SULFATE 1.25; 1.25; 1.25; 1.25 MG/1; MG/1; MG/1; MG/1
5 TABLET ORAL 2 TIMES DAILY
Qty: 60 TABLET | Refills: 0 | Status: SHIPPED | OUTPATIENT
Start: 2024-05-08

## 2024-05-08 NOTE — PROGRESS NOTES
ADULT ANNUAL PHYSICAL  [facility]    NAME: Karina Salazar  AGE: 19 y.o. SEX: female  : 2005     DATE: 2024     Assessment and Plan:     Problem List Items Addressed This Visit          Behavioral Health    Adult ADHD (Chronic)     This is a chronic and stable disease process.   Continue both Adderall medications  Adderall XR 15mg daily   AND  Adderall 5mg twice a day     Patient has Based upon the Diagnostic and Statistical Manual of Mental Health Disorders (DSM-5 guide for mental health disorders)    Patient has positive findings as listed on the Adult ADHD-RS-IV with Adult Prompts Document.    The patient has completed the controlled substance paperwork.     This patient was educated on side effects, risks of taking this type of medication which include abuse, misuse, dependence, addiction and tolerance.    All questions were answered including different dosing levels, increasing and decreasing of of this medication.  We discussed their continued open discussions with the PCP in order to make sure that they are on the correct dose.     We reviewed the potential side effects of the medications including, but are not limited to, constipation, diarrhea, nausea/upset stomach, drowsiness, fatigue, dizziness, urinary retention, visual changes, insomnia, headaches, nightmares, slowed breathing while sleeping, UTI issues, impaired judgment, addiction issues and the risk of fatal overdose if not taken as prescribed.   We discussed the importance of notifying the office/provider immediately of any side effects.   We discussed the importance of immediate notification if there are any feelings of suicidality thoughts or behaviors   We discussed the importance of contacting the office if he has any tachycardia or fainting situations.     The patient was warned against driving while taking sedation medications.    We discussed that sharing medications is a  fady.   We discussed other side effects such as QT prolongation, risks of Myocardial Infarction (heart attack), stroke and sudden death.   We discussed immediate notification if there is any seizure-like activity.    We discussed issues with angioedema as an anaphylactic reactions.    Patient agrees to provide a Urine Sample for Toxicology purposes.  I have personally reviewed the [place] Drug Monitoring Program (PDMP) website prior to prescribing this medication.  The patient understands and agrees to use these medications only as prescribed. At this point in time, the patient is showing no signs of addiction, abuse, diversion or suicidal ideation.  All the patient's questions were answered to their satisfaction.  [place] Prescription Drug Monitoring Program report was reviewed and was appropriate       I have personally reviewed the [place] Drug Monitoring Program (PDMP) website prior to prescribing this medication.  The patient understands and agrees to use these medications only as prescribed.   At this point in time, the patient is showing no signs of addiction, abuse, diversion or suicidal ideation.  The patient's use has been found to be appropriate without any concerns for misuse by the patient.   The patient's current conditions require continued scheduled medication use at this time.   Future review for continued appropriate medication use and misuse will continue.    All the patient's questions were answered to their satisfaction.  [place] Prescription Drug Monitoring Program report was reviewed and was appropriate       This patient has a Toxicology test scheduled for his/her appointment.          Relevant Medications    amphetamine-dextroamphetamine (ADDERALL XR, 15MG,) 15 MG 24 hr capsule    amphetamine-dextroamphetamine (ADDERALL, 5MG,) 5 MG tablet    Other Relevant Orders    Urine Drug Screen    Methylphenidate    Fentanyl with Confirmation    Buprenorphine w/ Naloxone     Naltrexone    Gabapentin    Pregabalin    Synthetic Stimulants    [facility] Prescribed Medications       Obstetrics/Gynecology    Screening for cervical cancer    Relevant Orders    Ambulatory Referral to Obstetrics / Gynecology    Ambulatory Referral to Obstetrics / Gynecology       Other    Annual physical exam - Primary    Relevant Orders    CBC and Platelet    Comprehensive metabolic panel    CBC and Platelet    Comprehensive metabolic panel    Screening for hyperlipidemia    Relevant Orders    Lipid panel    Lipid panel    Screening for diabetes mellitus    Relevant Orders    Hemoglobin A1C    Hemoglobin A1C    Encounter for therapeutic drug level monitoring    Relevant Orders    Urine Drug Screen    Methylphenidate    Fentanyl with Confirmation    Buprenorphine w/ Naloxone    Naltrexone    Gabapentin    Pregabalin    Synthetic Stimulants    [facility] Prescribed Medications    Encounter for immunization     Other Visit Diagnoses       Vitamin D deficiency        Relevant Orders    Vitamin D 25 hydroxy    Vitamin D 25 hydroxy            Immunizations and preventive care screenings were discussed with patient today. Appropriate education was printed on patient's after visit summary.    Counseling:  Alcohol/drug use: discussed moderation in alcohol intake, the recommendations for healthy alcohol use, and avoidance of illicit drug use.  Dental Health: discussed importance of regular tooth brushing, flossing, and dental visits.  Injury prevention: discussed safety/seat belts, safety helmets, smoke detectors, carbon dioxide detectors, and smoking near bedding or upholstery.  Sexual health: discussed sexually transmitted diseases, partner selection, use of condoms, avoidance of unintended pregnancy, and contraceptive alternatives.  Exercise: the importance of regular exercise/physical activity was discussed. Recommend exercise 3-5 times per week for at least 30 minutes.       Depression Screening and Follow-up Plan:  Patient was screened for depression during today's encounter. They screened negative with a PHQ-9 score of 0.        Return in about 1 year (around 2025) for Annual physical.     Chief Complaint:     Chief Complaint   Patient presents with    Follow-up     Discuss adderall not helping, lack of focus,   patient needs needs aletter to refuse flu shot serious reactions to vaccine in the past       History of Present Illness:     Adult Annual Physical   Patient here for a comprehensive physical exam. The patient reports problems - as discussed  .    Diet and Physical Activity  Diet/Nutrition: well balanced diet.   Exercise: 3-4 times a week on average.      Depression Screening  PHQ-2/9 Depression Screening    Little interest or pleasure in doing things: 0 - not at all  Feeling down, depressed, or hopeless: 0 - not at all  Trouble falling or staying asleep, or sleeping too much: 0 - not at all  Feeling tired or having little energy: 0 - not at all  Poor appetite or overeatin - not at all  Feeling bad about yourself - or that you are a failure or have let yourself or your family down: 0 - not at all  Trouble concentrating on things, such as reading the newspaper or watching television: 0 - not at all  Moving or speaking so slowly that other people could have noticed. Or the opposite - being so fidgety or restless that you have been moving around a lot more than usual: 0 - not at all  Thoughts that you would be better off dead, or of hurting yourself in some way: 0 - not at all  PHQ-9 Score: 0  PHQ-9 Interpretation: No or Minimal depression       General Health  Sleep: sleeps well.   Hearing: normal - bilateral.  Vision: no vision problems.   Dental: regular dental visits.       /GYN Health  Follows with gynecology? yes   Last menstrual period: .  Contraceptive method:  . .  History of STDs?: no.     Advanced Care Planning  Do you have an advanced directive? no  Do you have a durable medical power of ?  no  ACP document given to the patient? no      Review of Systems:     Review of Systems   Constitutional:  Negative for activity change, chills, fatigue and fever.   HENT:  Negative for rhinorrhea and sore throat.    Eyes:  Negative for pain.   Respiratory:  Negative for cough and shortness of breath.    Cardiovascular:  Negative for chest pain, palpitations and leg swelling.   Gastrointestinal:  Negative for abdominal pain, constipation, diarrhea, nausea and vomiting.   Genitourinary:  Negative for difficulty urinating, flank pain, frequency and urgency.   Musculoskeletal:  Negative for gait problem, joint swelling and myalgias.   Skin:  Negative for color change.   Neurological:  Negative for dizziness, weakness, light-headedness and headaches.   Psychiatric/Behavioral:  Positive for decreased concentration. Negative for sleep disturbance. The patient is not nervous/anxious.    All other systems reviewed and are negative.     Past Medical History:     Past Medical History:   Diagnosis Date    Anxiety     Depression       Past Surgical History:     Past Surgical History:   Procedure Laterality Date    MOUTH SURGERY        Social History:     Social History     Socioeconomic History    Marital status: Single     Spouse name: None    Number of children: None    Years of education: None    Highest education level: None   Occupational History    Occupation: student   Tobacco Use    Smoking status: Never     Passive exposure: Never    Smokeless tobacco: Never   Vaping Use    Vaping status: Never Used   Substance and Sexual Activity    Alcohol use: Never    Drug use: Never    Sexual activity: None   Other Topics Concern    None   Social History Narrative    None     Social Determinants of Health     Financial Resource Strain: Not on file   Food Insecurity: Not on file   Transportation Needs: Not on file   Physical Activity: Not on file   Stress: Not on file   Social Connections: Not on file   Intimate Partner Violence:  "Not on file   Housing Stability: Not on file      Family History:     Family History   Problem Relation Age of Onset    No Known Problems Mother     No Known Problems Father       Current Medications:     Current Outpatient Medications   Medication Sig Dispense Refill    amphetamine-dextroamphetamine (ADDERALL XR, 15MG,) 15 MG 24 hr capsule Take 1 capsule (15 mg total) by mouth every morning Max Daily Amount: 15 mg 30 capsule 0    amphetamine-dextroamphetamine (ADDERALL, 5MG,) 5 MG tablet Take 1 tablet (5 mg total) by mouth 2 (two) times a day Max Daily Amount: 10 mg 60 tablet 0     No current facility-administered medications for this visit.      Allergies:     No Known Allergies   Physical Exam:     /68   Pulse 75   Temp 97.5 °F (36.4 °C)   Ht 5' 4.5\" (1.638 m) Comment: with shoes  Wt 60.3 kg (133 lb)   LMP 04/28/2024   SpO2 97%   BMI 22.48 kg/m²     Physical Exam  Vitals and nursing note reviewed.   Constitutional:       General: She is awake. She is not in acute distress.     Appearance: Normal appearance. She is well-developed and overweight.   HENT:      Head: Normocephalic and atraumatic.      Nose: Nose normal.      Mouth/Throat:      Mouth: Mucous membranes are moist.   Eyes:      Conjunctiva/sclera: Conjunctivae normal.   Cardiovascular:      Rate and Rhythm: Normal rate and regular rhythm.      Pulses: Normal pulses.      Heart sounds: Normal heart sounds. No murmur heard.  Pulmonary:      Effort: Pulmonary effort is normal. No respiratory distress.      Breath sounds: Normal breath sounds.   Abdominal:      General: Bowel sounds are normal.      Palpations: Abdomen is soft.      Tenderness: There is no abdominal tenderness.   Musculoskeletal:      Cervical back: Neck supple.      Right lower leg: No edema.      Left lower leg: No edema.   Skin:     General: Skin is warm and dry.   Neurological:      Mental Status: She is alert and oriented to person, place, and time.   Psychiatric:         " Attention and Perception: Attention normal.         Mood and Affect: Mood normal.         Speech: Speech normal.         Behavior: Behavior normal. Behavior is cooperative.         Thought Content: Thought content normal.         Cognition and Memory: Cognition normal.         Judgment: Judgment normal.      Comments: Decreased concentration with improvement since being placed on medications           RITESH Benson   [facility]

## 2024-05-08 NOTE — PATIENT INSTRUCTIONS
Wellness Visit for Adults   AMBULATORY CARE:   A wellness visit  is when you see your healthcare provider to get screened for health problems. Your healthcare provider will also give you advice on how to stay healthy. Write down your questions so you remember to ask them. Ask your healthcare provider how often you should have a wellness visit.  What happens at a wellness visit:  Your healthcare provider will ask about your health, and your family history of health problems. This includes high blood pressure, heart disease, and cancer. He or she will ask if you have symptoms that concern you, if you smoke, and about your mood. You may also be asked about your intake of medicines, supplements, food, and alcohol. Any of the following may be done:  Your weight  will be checked. Your height may also be checked so your body mass index (BMI) can be calculated. Your BMI shows if you are at a healthy weight.    Your blood pressure  and heart rate will be checked. Your temperature may also be checked.    Blood and urine tests  may be done. Blood tests may be done to check your cholesterol levels. Abnormal cholesterol levels increase your risk for heart disease and stroke. You may also need a blood or urine test to check for diabetes if you are at increased risk. Urine tests may be done to look for signs of an infection or kidney disease.    A physical exam  includes checking your heartbeat and lungs with a stethoscope. Your healthcare provider may also check your skin to look for sun damage.    Screening tests  may be recommended. A screening test is done to check for diseases that may not cause symptoms. The screening tests you may need depend on your age, gender, family history, and lifestyle habits. For example, colorectal screening may be recommended if you are 50 years old or older.    Screening tests you need if you are a woman:   A Pap smear  is used to screen for cervical cancer. Pap smears are usually done every 3 to  5 years depending on your age. You may need them more often if you have had abnormal Pap smear test results in the past. Ask your healthcare provider how often you should have a Pap smear.    A mammogram  is an x-ray of your breasts to screen for breast cancer. Experts recommend mammograms every 2 years starting at age 50 years. You may need a mammogram at age 49 years or younger if you have an increased risk for breast cancer. Talk to your healthcare provider about when you should start having mammograms and how often you need them.    Vaccines you may need:   Get an influenza vaccine  every year. The influenza vaccine protects you from the flu. Several types of viruses cause the flu. The viruses change over time, so new vaccines are made each year.    Get a tetanus-diphtheria (Td) booster vaccine  every 10 years. This vaccine protects you against tetanus and diphtheria. Tetanus is a severe infection that may cause painful muscle spasms and lockjaw. Diphtheria is a severe bacterial infection that causes a thick covering in the back of your mouth and throat.    Get a human papillomavirus (HPV) vaccine  if you are female and aged 19 to 26 or male 19 to 21 and never received it. This vaccine protects you from HPV infection. HPV is the most common infection spread by sexual contact. HPV may also cause vaginal, penile, and anal cancers.    Get a pneumococcal vaccine  if you are aged 65 years or older. The pneumococcal vaccine is an injection given to protect you from pneumococcal disease. Pneumococcal disease is an infection caused by pneumococcal bacteria. The infection may cause pneumonia, meningitis, or an ear infection.    Get a shingles vaccine  if you are 60 or older, even if you have had shingles before. The shingles vaccine is an injection to protect you from the varicella-zoster virus. This is the same virus that causes chickenpox. Shingles is a painful rash that develops in people who had chickenpox or have  been exposed to the virus.    How to eat healthy:  My Plate is a model for planning healthy meals. It shows the types and amounts of foods that should go on your plate. Fruits and vegetables make up about half of your plate, and grains and protein make up the other half. A serving of dairy is included on the side of your plate. The amount of calories and serving sizes you need depends on your age, gender, weight, and height. Examples of healthy foods are listed below:  Eat a variety of vegetables  such as dark green, red, and orange vegetables. You can also include canned vegetables low in sodium (salt) and frozen vegetables without added butter or sauces.    Eat a variety of fresh fruits , canned fruit in 100% juice, frozen fruit, and dried fruit.    Include whole grains.  At least half of the grains you eat should be whole grains. Examples include whole-wheat bread, wheat pasta, brown rice, and whole-grain cereals such as oatmeal.    Eat a variety of protein foods such as seafood (fish and shellfish), lean meat, and poultry without skin (turkey and chicken). Examples of lean meats include pork leg, shoulder, or tenderloin, and beef round, sirloin, tenderloin, and extra lean ground beef. Other protein foods include eggs and egg substitutes, beans, peas, soy products, nuts, and seeds.    Choose low-fat dairy products such as skim or 1% milk or low-fat yogurt, cheese, and cottage cheese.    Limit unhealthy fats  such as butter, hard margarine, and shortening.       Exercise:  Exercise at least 30 minutes per day on most days of the week. Some examples of exercise include walking, biking, dancing, and swimming. You can also fit in more physical activity by taking the stairs instead of the elevator or parking farther away from stores. Include muscle strengthening activities 2 days each week. Regular exercise provides many health benefits. It helps you manage your weight, and decreases your risk for type 2 diabetes,  heart disease, stroke, and high blood pressure. Exercise can also help improve your mood. Ask your healthcare provider about the best exercise plan for you.       General health and safety guidelines:   Do not smoke.  Nicotine and other chemicals in cigarettes and cigars can cause lung damage. Ask your healthcare provider for information if you currently smoke and need help to quit. E-cigarettes or smokeless tobacco still contain nicotine. Talk to your healthcare provider before you use these products.    Limit alcohol.  A drink of alcohol is 12 ounces of beer, 5 ounces of wine, or 1½ ounces of liquor.    Lose weight, if needed.  Being overweight increases your risk of certain health conditions. These include heart disease, high blood pressure, type 2 diabetes, and certain types of cancer.    Protect your skin.  Do not sunbathe or use tanning beds. Use sunscreen with a SPF 15 or higher. Apply sunscreen at least 15 minutes before you go outside. Reapply sunscreen every 2 hours. Wear protective clothing, hats, and sunglasses when you are outside.    Drive safely.  Always wear your seatbelt. Make sure everyone in your car wears a seatbelt. A seatbelt can save your life if you are in an accident. Do not use your cell phone when you are driving. This could distract you and cause an accident. Pull over if you need to make a call or send a text message.    Practice safe sex.  Use latex condoms if are sexually active and have more than one partner. Your healthcare provider may recommend screening tests for sexually transmitted infections (STIs).    Wear helmets, lifejackets, and protective gear.  Always wear a helmet when you ride a bike or motorcycle, go skiing, or play sports that could cause a head injury. Wear protective equipment when you play sports. Wear a lifejacket when you are on a boat or doing water sports.    © Copyright [employer] 2023 Information is for End User's use only and may not be sold, redistributed or  otherwise used for commercial purposes.  The above information is an  only. It is not intended as medical advice for individual conditions or treatments. Talk to your doctor, nurse or pharmacist before following any medical regimen to see if it is safe and effective for you.    Cholesterol and Your Health   AMBULATORY CARE:   Cholesterol  is a waxy, fat-like substance. Your body uses cholesterol to make hormones and new cells, and to protect nerves. Cholesterol is made by your body. It also comes from certain foods you eat, such as meat and dairy products. Your healthcare provider can help you set goals for your cholesterol levels. Your provider can help you create a plan to meet your goals.  Cholesterol level goals:  Your cholesterol level goals depend on your risk for heart disease, your age, and your other health conditions. The following are general guidelines:  Total cholesterol  includes low-density lipoprotein (LDL), high-density lipoprotein (HDL), and triglyceride levels. The total cholesterol level should be lower than 200 mg/dL and is best at about 150 mg/dL.    LDL cholesterol  is called bad cholesterol  because it forms plaque in your arteries. As plaque builds up, your arteries become narrow, and less blood flows through. When plaque decreases blood flow to your heart, you may have chest pain. If plaque completely blocks an artery that brings blood to your heart, you may have a heart attack. Plaque can break off and form blood clots. Blood clots may block arteries in your brain and cause a stroke. The level should be less than 130 mg/dL and is best at about 100 mg/dL.         HDL cholesterol  is called good cholesterol  because it helps remove LDL cholesterol from your arteries. It does this by attaching to LDL cholesterol and carrying it to your liver. Your liver breaks down LDL cholesterol so your body can get rid of it. High levels of HDL cholesterol can help prevent a heart attack and  stroke. Low levels of HDL cholesterol can increase your risk for heart disease, heart attack, and stroke. The level should be at least 40 mg/dL in males or at least 50 mg/dL in females.    Triglycerides  are a type of fat that store energy from foods you eat. High levels of triglycerides also cause plaque buildup. This can increase your risk for a heart attack or stroke. If your triglyceride level is high, your LDL cholesterol level may also be high. The level should be less than 150 mg/dL.    Any of the following can increase your risk for high cholesterol:   Smoking or drinking large amounts of alcohol    Having overweight or obesity, or not getting enough exercise    A medical condition such as hypertension (high blood pressure) or diabetes    A family history of high cholesterol    Age older than 65    What you need to know about having your cholesterol levels checked:  Adults 20 to 45 years of age should have their cholesterol levels checked every 4 to 6 years. Adults 45 years or older should have their cholesterol checked every 1 to 2 years. You may need your cholesterol checked more often, or at a younger age, if you have risk factors for heart disease. You may also need to have your cholesterol checked more often if you have other health conditions, such as diabetes. Blood tests are used to check cholesterol levels. Blood tests measure your levels of triglycerides, LDL cholesterol, and HDL cholesterol.  How healthy fats affect your cholesterol levels:  Healthy fats, also called unsaturated fats, help lower LDL cholesterol and triglyceride levels. Healthy fats include the following:  Monounsaturated fats  are found in foods such as olive oil, canola oil, avocado, nuts, and olives.    Polyunsaturated fats,  such as omega 3 fats, are found in fish, such as salmon, trout, and tuna. They can also be found in plant foods such as flaxseed, walnuts, and soybeans.    How unhealthy fats affect your cholesterol levels:   Unhealthy fats increase LDL cholesterol and triglyceride levels. They are found in foods high in cholesterol, saturated fat, and trans fat:  Cholesterol  is found in eggs, dairy, and meat.    Saturated fat  is found in butter, cheese, ice cream, whole milk, and coconut oil. Saturated fat is also found in meat, such as sausage, hot dogs, and bologna.    Trans fat  is found in liquid oils and is used in fried and baked foods. Foods that contain trans fats include chips, crackers, muffins, sweet rolls, microwave popcorn, and cookies.    Treatment  for high cholesterol will also decrease your risk of heart disease, heart attack, and stroke. Treatment may include any of the following:  Lifestyle changes  may include food, exercise, weight loss, and quitting smoking. You may also need to decrease the amount of alcohol you drink. Your healthcare provider will want you to start with lifestyle changes. Other treatment may be added if lifestyle changes are not enough. Your healthcare provider may recommend you work with a team to manage hyperlipidemia. The team may include medical experts such as a dietitian, an exercise or physical therapist, and a behavior therapist. Your family members may be included in helping you create lifestyle changes.    Medicines  may be given to lower your LDL cholesterol, triglyceride levels, or total cholesterol level. You may need medicines to lower your cholesterol if any of the following is true:    You have a history of stroke, TIA, unstable angina, or a heart attack.    Your LDL cholesterol level is 190 mg/dL or higher.    You are age 40 to 75 years, have diabetes or heart disease risk factors, and your LDL cholesterol is 70 mg/dL or higher.    Supplements  include fish oil, red yeast rice, and garlic. Fish oil may help lower your triglyceride and LDL cholesterol levels. It may also increase your HDL cholesterol level. Red yeast rice may help decrease your total cholesterol level and LDL  cholesterol level. Garlic may help lower your total cholesterol level. Do not take any supplements without talking to your healthcare provider.    Food changes you can make to lower your cholesterol levels:  A dietitian can help you create a healthy eating plan. Your dietitian can show you how to read food labels and choose foods low in saturated fat, trans fats, and cholesterol.     Decrease the total amount of fat you eat.  Choose lean meats, fat-free or 1% fat milk, and low-fat dairy products, such as yogurt and cheese. Try to limit or avoid red meats. Limit or do not eat fried foods or baked goods, such as cookies.    Replace unhealthy fats with healthy fats.  Cook foods in olive oil or canola oil. Choose soft margarines that are low in saturated fat and trans fat. Seeds, nuts, and avocados are other examples of healthy fats.    Eat foods with omega-3 fats.  Examples include salmon, tuna, mackerel, walnuts, and flaxseed. Eat fish 2 times per week. Pregnant women should not eat fish that have high levels of mercury, such as shark, swordfish, and jeana mackerel.         Increase the amount of high-fiber foods you eat.  High-fiber foods can help lower your LDL cholesterol. Aim to get between 20 and 30 grams of fiber each day. Fruits and vegetables are high in fiber. Eat at least 5 servings each day. Other high-fiber foods are whole-grain or whole-wheat breads, pastas, or cereals, and brown rice. Eat 3 ounces of whole-grain foods each day. Increase fiber slowly. You may have abdominal discomfort, bloating, and gas if you add fiber to your diet too quickly.         Eat healthy protein foods.  Examples include low-fat dairy products, skinless chicken and turkey, fish, and nuts.    Limit foods and drinks that are high in sugar.  Your dietitian or healthcare provider can help you create daily limits for high-sugar foods and drinks. The limit may be lower if you have diabetes or another health condition. Limits can also  help you lose weight if needed.  Lifestyle changes you can make to lower your cholesterol levels:   Maintain a healthy weight.  Ask your healthcare provider what a healthy weight is for you. Ask your provider to help you create a weight loss plan if needed. Weight loss can decrease your total cholesterol and triglyceride levels. Weight loss may also help keep your blood pressure at a healthy level.    Be physically active throughout the day.  Physical activity, such as exercise, can help lower your total cholesterol level and maintain a healthy weight. Physical activity can also help increase your HDL cholesterol level. Work with your healthcare provider to create an program that is right for you. Get at least 30 to 40 minutes of moderate physical activity most days of the week. Examples of exercise include brisk walking, swimming, or biking. Also include strength training at least 2 times each week. Your healthcare providers can help you create a physical activity plan.            Do not smoke.  Nicotine and other chemicals in cigarettes and cigars can raise your cholesterol levels. Ask your healthcare provider for information if you currently smoke and need help to quit. E-cigarettes or smokeless tobacco still contain nicotine. Talk to your healthcare provider before you use these products.         Limit or do not drink alcohol.  Alcohol can increase your triglyceride levels. Ask your healthcare provider before you drink alcohol. Ask how much is okay for you to drink in 24 hours or 1 week.    Follow up with your doctor as directed:  Write down your questions so you remember to ask them during your visits.  © Copyright [employer] 2023 Information is for End User's use only and may not be sold, redistributed or otherwise used for commercial purposes.  The above information is an  only. It is not intended as medical advice for individual conditions or treatments. Talk to your doctor, nurse or pharmacist  before following any medical regimen to see if it is safe and effective for you.  __________________________________________    YOU ARE DUE FOR YOUR ANNUAL WELLNESS EXAM AFTER 5/8/2025.    REPEAT LABS ORDERED, PLEASE HAVE THEM DRAWN THE WEEK PRIOR TO YOUR VISIT (APPROXIMATELY 4/28/2025).    LABS HAVE BEEN ORDERED FOR YOU.   THESE LABS SHOULD BE DRAWN PRIOR TO YOUR NEXT VISIT.  YOU CAN HAVE THEM DRAWN UP TO 2 WEEKS PRIOR TO YOUR NEXT VISIT.  HAVING YOUR BLOOD WORK WHEN YOU COME TO YOUR NEXT VISIT WILL ALLOW ME TO PROVIDE THE BEST MEDICAL CARE FOR YOU WITHOUT HAVING EITHER OF US PERFORM MORE WORK THAN NECESSARY.  PLEASE BE COMPLIANT WITH THIS REQUEST.

## 2024-05-08 NOTE — LETTER
"  May 8, 2024     Patient: Karina Salazar  YOB: 2005  Date of Visit: 5/8/2024      To Whom it May Concern:    Karina Salazar is under my professional care. Karina was seen in my office on 5/8/2024. Due to a documented history of adverse reaction to vaccinations, it is not advised at this time for this patient to receive any vaccinations. Also, on 4/1/2024 the in office stock of influenza vaccinations were removed as it is no longer \"flu season\".     If you have any questions or concerns, please don't hesitate to call.         Sincerely,          RITESH Benson        CC: No Recipients  "

## 2024-05-08 NOTE — ASSESSMENT & PLAN NOTE
This is a chronic and stable disease process.   Continue both Adderall medications  Adderall XR 15mg daily   AND  Adderall 5mg twice a day     Patient has Based upon the Diagnostic and Statistical Manual of Mental Health Disorders (DSM-5 guide for mental health disorders)    Patient has positive findings as listed on the Adult ADHD-RS-IV with Adult Prompts Document.    The patient has completed the controlled substance paperwork.     This patient was educated on side effects, risks of taking this type of medication which include abuse, misuse, dependence, addiction and tolerance.    All questions were answered including different dosing levels, increasing and decreasing of of this medication.  We discussed their continued open discussions with the PCP in order to make sure that they are on the correct dose.     We reviewed the potential side effects of the medications including, but are not limited to, constipation, diarrhea, nausea/upset stomach, drowsiness, fatigue, dizziness, urinary retention, visual changes, insomnia, headaches, nightmares, slowed breathing while sleeping, UTI issues, impaired judgment, addiction issues and the risk of fatal overdose if not taken as prescribed.   We discussed the importance of notifying the office/provider immediately of any side effects.   We discussed the importance of immediate notification if there are any feelings of suicidality thoughts or behaviors   We discussed the importance of contacting the office if he has any tachycardia or fainting situations.     The patient was warned against driving while taking sedation medications.    We discussed that sharing medications is a felony.   We discussed other side effects such as QT prolongation, risks of Myocardial Infarction (heart attack), stroke and sudden death.   We discussed immediate notification if there is any seizure-like activity.    We discussed issues with angioedema as an anaphylactic reactions.    Patient agrees  to provide a Urine Sample for Toxicology purposes.  I have personally reviewed the [place] Drug Monitoring Program (PDMP) website prior to prescribing this medication.  The patient understands and agrees to use these medications only as prescribed. At this point in time, the patient is showing no signs of addiction, abuse, diversion or suicidal ideation.  All the patient's questions were answered to their satisfaction.  [place] Prescription Drug Monitoring Program report was reviewed and was appropriate       I have personally reviewed the [place] Drug Monitoring Program (PDMP) website prior to prescribing this medication.  The patient understands and agrees to use these medications only as prescribed.   At this point in time, the patient is showing no signs of addiction, abuse, diversion or suicidal ideation.  The patient's use has been found to be appropriate without any concerns for misuse by the patient.   The patient's current conditions require continued scheduled medication use at this time.   Future review for continued appropriate medication use and misuse will continue.    All the patient's questions were answered to their satisfaction.  [place] Prescription Drug Monitoring Program report was reviewed and was appropriate       This patient has a Toxicology test scheduled for his/her appointment.

## 2024-05-13 LAB
6-BETA NALTREXOL UR CFM-MCNC: NEGATIVE NG/ML
6MAM UR QL: NEGATIVE NG/ML
AMPHETAMINES UR QL: NEGATIVE NG/ML
BARBITURATES UR QL: NEGATIVE NG/ML
BENZODIAZ UR QL: NEGATIVE NG/ML
BUPRENORPHINE UR QL: NEGATIVE NG/ML
BUTYLONE: NEGATIVE NG/ML
BZE UR QL: NEGATIVE NG/ML
CREAT UR-MCNC: 218.5 MG/DL
ETHANOL UR QL: NEGATIVE NG/ML
FENTANYL: NEGATIVE NG/ML
GABAPENTIN UR-MCNC: NEGATIVE NG/ML
MDPV: NEGATIVE NG/ML
MEDICATION PRESCRIBED: NORMAL
MEPHEDRONE: NEGATIVE NG/ML
METHADONE UR QL: NEGATIVE NG/ML
METHYLONE: NEGATIVE NG/ML
NALTREXONE UR-MCNC: NEGATIVE NG/ML
OPIATES UR QL: NEGATIVE NG/ML
OXIDANTS UR QL: NEGATIVE MCG/ML
OXYCODONE UR QL: NEGATIVE NG/ML
PCP UR QL: NEGATIVE NG/ML
PH UR: 8.6 [PH] (ref 4.5–9)
SL AMB MEDMATCH AMPTHETAMINES: ABNORMAL
SL AMB PREGABALIN: NEGATIVE NG/ML
SL AMB RITALINIC ACID: NEGATIVE NG/ML
THC UR QL: NEGATIVE NG/ML

## 2024-05-14 ENCOUNTER — APPOINTMENT (OUTPATIENT)
Dept: URGENT CARE | Facility: MEDICAL CENTER | Age: 19
End: 2024-05-14

## 2024-05-14 DIAGNOSIS — Z02.1 PHYSICAL EXAM, PRE-EMPLOYMENT: ICD-10-CM

## 2024-05-14 LAB — RUBV IGG SERPL IA-ACNC: 173.9 IU/ML

## 2024-05-14 PROCEDURE — 86787 VARICELLA-ZOSTER ANTIBODY: CPT | Performed by: PHYSICIAN ASSISTANT

## 2024-05-14 PROCEDURE — 86480 TB TEST CELL IMMUN MEASURE: CPT | Performed by: PHYSICIAN ASSISTANT

## 2024-05-14 PROCEDURE — 86762 RUBELLA ANTIBODY: CPT | Performed by: PHYSICIAN ASSISTANT

## 2024-05-14 PROCEDURE — 86765 RUBEOLA ANTIBODY: CPT | Performed by: PHYSICIAN ASSISTANT

## 2024-05-14 PROCEDURE — 86706 HEP B SURFACE ANTIBODY: CPT | Performed by: PHYSICIAN ASSISTANT

## 2024-05-14 PROCEDURE — 86735 MUMPS ANTIBODY: CPT | Performed by: PHYSICIAN ASSISTANT

## 2024-05-15 LAB
HBV SURFACE AB SER-ACNC: <3 MIU/ML
MEV IGG SER QL IA: NORMAL
MUV IGG SER QL IA: NORMAL
VZV IGG SER QL IA: NORMAL

## 2024-05-17 ENCOUNTER — TELEPHONE (OUTPATIENT)
Dept: INTERNAL MEDICINE CLINIC | Facility: CLINIC | Age: 19
End: 2024-05-17

## 2024-05-17 NOTE — TELEPHONE ENCOUNTER
Called patient on 5/17 to let her know that Tarsha is not in the office as the paper needs to be signed by her by Monday. Patient was made aware that Tarsha will not be in the office until Tuesday, she asked if we could have Nessa fill out form as she has seen her previously. Patient was made aware that we cannot guarantee this paperwork will be done by Nessa as she is not her provider.

## 2024-05-20 ENCOUNTER — TELEPHONE (OUTPATIENT)
Dept: INTERNAL MEDICINE CLINIC | Facility: CLINIC | Age: 19
End: 2024-05-20

## 2024-05-21 ENCOUNTER — TELEPHONE (OUTPATIENT)
Dept: INTERNAL MEDICINE CLINIC | Facility: CLINIC | Age: 19
End: 2024-05-21

## 2024-05-22 ENCOUNTER — TELEPHONE (OUTPATIENT)
Dept: INTERNAL MEDICINE CLINIC | Facility: CLINIC | Age: 19
End: 2024-05-22

## 2024-05-22 NOTE — TELEPHONE ENCOUNTER
Lm letting patient know that Medical Response paper was signed and scanned into chart, also asked if she wants to  the form or needs it faxed somewhere.

## 2024-05-24 ENCOUNTER — TELEPHONE (OUTPATIENT)
Age: 19
End: 2024-05-24

## 2024-05-24 NOTE — TELEPHONE ENCOUNTER
Patient called and said the office was supposed to fax a Medical Response Form that is in her chart to [facility].  She called the [facility] and they said they did not get the form.  She is asking that the form be faxed to [phone] and to please call her once the form is faxed.

## 2024-05-30 ENCOUNTER — TELEPHONE (OUTPATIENT)
Dept: INTERNAL MEDICINE CLINIC | Facility: CLINIC | Age: 19
End: 2024-05-30

## 2024-06-07 PROBLEM — Z13.220 SCREENING FOR HYPERLIPIDEMIA: Status: RESOLVED | Noted: 2023-01-27 | Resolved: 2024-06-07

## 2024-06-07 PROBLEM — Z13.1 SCREENING FOR DIABETES MELLITUS: Status: RESOLVED | Noted: 2024-05-08 | Resolved: 2024-06-07

## 2024-06-07 PROBLEM — Z12.4 SCREENING FOR CERVICAL CANCER: Status: RESOLVED | Noted: 2024-05-08 | Resolved: 2024-06-07

## 2024-06-20 DIAGNOSIS — F90.9 ADULT ADHD: Chronic | ICD-10-CM

## 2024-06-20 RX ORDER — DEXTROAMPHETAMINE SACCHARATE, AMPHETAMINE ASPARTATE MONOHYDRATE, DEXTROAMPHETAMINE SULFATE AND AMPHETAMINE SULFATE 3.75; 3.75; 3.75; 3.75 MG/1; MG/1; MG/1; MG/1
15 CAPSULE, EXTENDED RELEASE ORAL EVERY MORNING
Qty: 30 CAPSULE | Refills: 0 | Status: SHIPPED | OUTPATIENT
Start: 2024-06-20

## 2024-07-17 DIAGNOSIS — F90.9 ADULT ADHD: Chronic | ICD-10-CM

## 2024-07-18 RX ORDER — DEXTROAMPHETAMINE SACCHARATE, AMPHETAMINE ASPARTATE MONOHYDRATE, DEXTROAMPHETAMINE SULFATE AND AMPHETAMINE SULFATE 3.75; 3.75; 3.75; 3.75 MG/1; MG/1; MG/1; MG/1
15 CAPSULE, EXTENDED RELEASE ORAL EVERY MORNING
Qty: 30 CAPSULE | Refills: 0 | Status: SHIPPED | OUTPATIENT
Start: 2024-07-18

## 2024-09-03 DIAGNOSIS — F90.9 ADULT ADHD: Chronic | ICD-10-CM

## 2024-09-04 RX ORDER — DEXTROAMPHETAMINE SACCHARATE, AMPHETAMINE ASPARTATE MONOHYDRATE, DEXTROAMPHETAMINE SULFATE AND AMPHETAMINE SULFATE 3.75; 3.75; 3.75; 3.75 MG/1; MG/1; MG/1; MG/1
15 CAPSULE, EXTENDED RELEASE ORAL EVERY MORNING
Qty: 30 CAPSULE | Refills: 0 | Status: SHIPPED | OUTPATIENT
Start: 2024-09-04

## 2024-10-17 DIAGNOSIS — F90.9 ADULT ADHD: Chronic | ICD-10-CM

## 2024-10-19 RX ORDER — DEXTROAMPHETAMINE SACCHARATE, AMPHETAMINE ASPARTATE MONOHYDRATE, DEXTROAMPHETAMINE SULFATE AND AMPHETAMINE SULFATE 3.75; 3.75; 3.75; 3.75 MG/1; MG/1; MG/1; MG/1
15 CAPSULE, EXTENDED RELEASE ORAL EVERY MORNING
Qty: 30 CAPSULE | Refills: 0 | Status: SHIPPED | OUTPATIENT
Start: 2024-10-19

## 2024-11-22 DIAGNOSIS — F90.9 ADULT ADHD: Chronic | ICD-10-CM

## 2024-11-25 RX ORDER — DEXTROAMPHETAMINE SACCHARATE, AMPHETAMINE ASPARTATE MONOHYDRATE, DEXTROAMPHETAMINE SULFATE AND AMPHETAMINE SULFATE 3.75; 3.75; 3.75; 3.75 MG/1; MG/1; MG/1; MG/1
15 CAPSULE, EXTENDED RELEASE ORAL EVERY MORNING
Qty: 30 CAPSULE | Refills: 0 | Status: SHIPPED | OUTPATIENT
Start: 2024-11-25

## 2024-12-29 DIAGNOSIS — F90.9 ADULT ADHD: Chronic | ICD-10-CM

## 2024-12-30 RX ORDER — DEXTROAMPHETAMINE SACCHARATE, AMPHETAMINE ASPARTATE MONOHYDRATE, DEXTROAMPHETAMINE SULFATE AND AMPHETAMINE SULFATE 3.75; 3.75; 3.75; 3.75 MG/1; MG/1; MG/1; MG/1
15 CAPSULE, EXTENDED RELEASE ORAL EVERY MORNING
Qty: 30 CAPSULE | Refills: 0 | Status: SHIPPED | OUTPATIENT
Start: 2024-12-30

## 2025-01-29 DIAGNOSIS — F90.9 ADULT ADHD: Chronic | ICD-10-CM

## 2025-01-29 RX ORDER — DEXTROAMPHETAMINE SACCHARATE, AMPHETAMINE ASPARTATE MONOHYDRATE, DEXTROAMPHETAMINE SULFATE AND AMPHETAMINE SULFATE 3.75; 3.75; 3.75; 3.75 MG/1; MG/1; MG/1; MG/1
15 CAPSULE, EXTENDED RELEASE ORAL EVERY MORNING
Qty: 30 CAPSULE | Refills: 0 | Status: SHIPPED | OUTPATIENT
Start: 2025-01-29

## 2025-02-05 DIAGNOSIS — F90.9 ADULT ADHD: Chronic | ICD-10-CM

## 2025-02-07 RX ORDER — DEXTROAMPHETAMINE SACCHARATE, AMPHETAMINE ASPARTATE, DEXTROAMPHETAMINE SULFATE AND AMPHETAMINE SULFATE 1.25; 1.25; 1.25; 1.25 MG/1; MG/1; MG/1; MG/1
5 TABLET ORAL 2 TIMES DAILY
Qty: 60 TABLET | Refills: 0 | Status: SHIPPED | OUTPATIENT
Start: 2025-02-07

## 2025-02-12 ENCOUNTER — TELEPHONE (OUTPATIENT)
Dept: INTERNAL MEDICINE CLINIC | Facility: CLINIC | Age: 20
End: 2025-02-12

## 2025-02-12 NOTE — TELEPHONE ENCOUNTER
Left message for pt to call office     Provider is no longer in office, patient can stay with shan in [place] or continue in [place] with new provider

## 2025-03-05 DIAGNOSIS — F90.9 ADULT ADHD: Chronic | ICD-10-CM

## 2025-03-06 RX ORDER — DEXTROAMPHETAMINE SACCHARATE, AMPHETAMINE ASPARTATE MONOHYDRATE, DEXTROAMPHETAMINE SULFATE AND AMPHETAMINE SULFATE 3.75; 3.75; 3.75; 3.75 MG/1; MG/1; MG/1; MG/1
15 CAPSULE, EXTENDED RELEASE ORAL EVERY MORNING
Qty: 30 CAPSULE | Refills: 0 | Status: SHIPPED | OUTPATIENT
Start: 2025-03-06

## 2025-04-21 DIAGNOSIS — F90.9 ADULT ADHD: Chronic | ICD-10-CM

## 2025-04-23 RX ORDER — DEXTROAMPHETAMINE SACCHARATE, AMPHETAMINE ASPARTATE MONOHYDRATE, DEXTROAMPHETAMINE SULFATE AND AMPHETAMINE SULFATE 3.75; 3.75; 3.75; 3.75 MG/1; MG/1; MG/1; MG/1
15 CAPSULE, EXTENDED RELEASE ORAL EVERY MORNING
Qty: 30 CAPSULE | Refills: 0 | Status: SHIPPED | OUTPATIENT
Start: 2025-04-23

## 2025-05-27 DIAGNOSIS — F90.9 ADULT ADHD: Primary | Chronic | ICD-10-CM

## 2025-05-27 DIAGNOSIS — E55.9 VITAMIN D DEFICIENCY: ICD-10-CM

## 2025-05-27 DIAGNOSIS — Z51.81 ENCOUNTER FOR THERAPEUTIC DRUG MONITORING: ICD-10-CM

## 2025-05-27 DIAGNOSIS — F90.9 ADULT ADHD: Chronic | ICD-10-CM

## 2025-05-27 DIAGNOSIS — Z13.1 SCREENING FOR DIABETES MELLITUS: ICD-10-CM

## 2025-05-27 DIAGNOSIS — Z13.220 SCREENING FOR HYPERLIPIDEMIA: ICD-10-CM

## 2025-05-27 DIAGNOSIS — Z51.81 ENCOUNTER FOR THERAPEUTIC DRUG LEVEL MONITORING: Primary | ICD-10-CM

## 2025-05-27 DIAGNOSIS — Z00.00 ANNUAL PHYSICAL EXAM: ICD-10-CM

## 2025-05-28 DIAGNOSIS — F90.9 ADULT ADHD: Chronic | ICD-10-CM

## 2025-05-29 RX ORDER — DEXTROAMPHETAMINE SACCHARATE, AMPHETAMINE ASPARTATE MONOHYDRATE, DEXTROAMPHETAMINE SULFATE AND AMPHETAMINE SULFATE 3.75; 3.75; 3.75; 3.75 MG/1; MG/1; MG/1; MG/1
15 CAPSULE, EXTENDED RELEASE ORAL EVERY MORNING
Qty: 30 CAPSULE | Refills: 0 | Status: SHIPPED | OUTPATIENT
Start: 2025-05-29

## 2025-06-04 ENCOUNTER — OFFICE VISIT (OUTPATIENT)
Dept: FAMILY MEDICINE CLINIC | Facility: CLINIC | Age: 20
End: 2025-06-04
Payer: COMMERCIAL

## 2025-06-04 VITALS
WEIGHT: 126 LBS | HEIGHT: 65 IN | RESPIRATION RATE: 14 BRPM | BODY MASS INDEX: 20.99 KG/M2 | TEMPERATURE: 75 F | HEART RATE: 98 BPM | DIASTOLIC BLOOD PRESSURE: 60 MMHG | SYSTOLIC BLOOD PRESSURE: 110 MMHG

## 2025-06-04 DIAGNOSIS — D51.8 VITAMIN B12 DEFICIENCY (DIETARY) ANEMIA: ICD-10-CM

## 2025-06-04 DIAGNOSIS — Z00.00 ANNUAL PHYSICAL EXAM: Primary | ICD-10-CM

## 2025-06-04 DIAGNOSIS — Z79.899 ON LONG TERM DRUG THERAPY: ICD-10-CM

## 2025-06-04 DIAGNOSIS — Z23 ENCOUNTER FOR IMMUNIZATION: ICD-10-CM

## 2025-06-04 DIAGNOSIS — Z12.4 SCREENING FOR MALIGNANT NEOPLASM OF CERVIX: ICD-10-CM

## 2025-06-04 DIAGNOSIS — R94.6 ABNORMAL RESULTS OF THYROID FUNCTION STUDIES: ICD-10-CM

## 2025-06-04 DIAGNOSIS — R73.01 IMPAIRED FASTING GLUCOSE: ICD-10-CM

## 2025-06-04 DIAGNOSIS — F90.9 ADULT ADHD: Chronic | ICD-10-CM

## 2025-06-04 DIAGNOSIS — E78.00 PURE HYPERCHOLESTEROLEMIA: ICD-10-CM

## 2025-06-04 DIAGNOSIS — Z51.81 ENCOUNTER FOR THERAPEUTIC DRUG LEVEL MONITORING: Chronic | ICD-10-CM

## 2025-06-04 DIAGNOSIS — E55.9 VITAMIN D DEFICIENCY: ICD-10-CM

## 2025-06-04 PROCEDURE — 99395 PREV VISIT EST AGE 18-39: CPT | Performed by: NURSE PRACTITIONER

## 2025-06-04 RX ORDER — DEXTROAMPHETAMINE SACCHARATE, AMPHETAMINE ASPARTATE, DEXTROAMPHETAMINE SULFATE AND AMPHETAMINE SULFATE 1.25; 1.25; 1.25; 1.25 MG/1; MG/1; MG/1; MG/1
5 TABLET ORAL 2 TIMES DAILY
Qty: 60 TABLET | Refills: 0 | Status: SHIPPED | OUTPATIENT
Start: 2025-06-04

## 2025-06-04 NOTE — PATIENT INSTRUCTIONS
"Patient Education     Routine physical for adults   The Basics   Written by the doctors and editors at [facility]   What is a physical? -- A physical is a routine visit, or \"check-up,\" with your doctor. You might also hear it called a \"wellness visit\" or \"preventive visit.\"  During each visit, the doctor will:   Ask about your physical and mental health   Ask about your habits, behaviors, and lifestyle   Do an exam   Give you vaccines if needed   Talk to you about any medicines you take   Give advice about your health   Answer your questions  Getting regular check-ups is an important part of taking care of your health. It can help your doctor find and treat any problems you have. But it's also important for preventing health problems.  A routine physical is different from a \"sick visit.\" A sick visit is when you see a doctor because of a health concern or problem. Since physicals are scheduled ahead of time, you can think about what you want to ask the doctor.  How often should I get a physical? -- It depends on your age and health. In general, for people age 21 years and older:   If you are younger than 50 years, you might be able to get a physical every 3 years.   If you are 50 years or older, your doctor might recommend a physical every year.  If you have an ongoing health condition, like diabetes or high blood pressure, your doctor will probably want to see you more often.  What happens during a physical? -- In general, each visit will include:   Physical exam - The doctor or nurse will check your height, weight, heart rate, and blood pressure. They will also look at your eyes and ears. They will ask about how you are feeling and whether you have any symptoms that bother you.   Medicines - It's a good idea to bring a list of all the medicines you take to each doctor visit. Your doctor will talk to you about your medicines and answer any questions. Tell them if you are having any side effects that bother you. You " "should also tell them if you are having trouble paying for any of your medicines.   Habits and behaviors - This includes:   Your diet   Your exercise habits   Whether you smoke, drink alcohol, or use drugs   Whether you are sexually active   Whether you feel safe at home  Your doctor will talk to you about things you can do to improve your health and lower your risk of health problems. They will also offer help and support. For example, if you want to quit smoking, they can give you advice and might prescribe medicines. If you want to improve your diet or get more physical activity, they can help you with this, too.   Lab tests, if needed - The tests you get will depend on your age and situation. For example, your doctor might want to check your:   Cholesterol   Blood sugar   Iron level   Vaccines - The recommended vaccines will depend on your age, health, and what vaccines you already had. Vaccines are very important because they can prevent certain serious or deadly infections.   Discussion of screening - \"Screening\" means checking for diseases or other health problems before they cause symptoms. Your doctor can recommend screening based on your age, risk, and preferences. This might include tests to check for:   Cancer, such as breast, prostate, cervical, ovarian, colorectal, prostate, lung, or skin cancer   Sexually transmitted infections, such as chlamydia and gonorrhea   Mental health conditions like depression and anxiety  Your doctor will talk to you about the different types of screening tests. They can help you decide which screenings to have. They can also explain what the results might mean.   Answering questions - The physical is a good time to ask the doctor or nurse questions about your health. If needed, they can refer you to other doctors or specialists, too.  Adults older than 65 years often need other care, too. As you get older, your doctor will talk to you about:   How to prevent falling at " home   Hearing or vision tests   Memory testing   How to take your medicines safely   Making sure that you have the help and support you need at home  All topics are updated as new evidence becomes available and our peer review process is complete.  This topic retrieved from [employer] on: May 02, 2024.  Topic 465974 Version 1.0  Release: 32.4.3 - C32.122  © 2024 UpToDate, Inc. and/or its affiliates. All rights reserved.  Consumer Information Use and Disclaimer   Disclaimer: This generalized information is a limited summary of diagnosis, treatment, and/or medication information. It is not meant to be comprehensive and should be used as a tool to help the user understand and/or assess potential diagnostic and treatment options. It does NOT include all information about conditions, treatments, medications, side effects, or risks that may apply to a specific patient. It is not intended to be medical advice or a substitute for the medical advice, diagnosis, or treatment of a health care provider based on the health care provider's examination and assessment of a patient's specific and unique circumstances. Patients must speak with a health care provider for complete information about their health, medical questions, and treatment options, including any risks or benefits regarding use of medications. This information does not endorse any treatments or medications as safe, effective, or approved for treating a specific patient. UpToDate, Inc. and its affiliates disclaim any warranty or liability relating to this information or the use thereof.The use of this information is governed by the Terms of Use, available at https://www.[employer].com/en/know/clinical-effectiveness-terms. 2024© UpToDate, Inc. and its affiliates and/or licensors. All rights reserved.  Copyright   © 2024 UpToDate, Inc. and/or its affiliates. All rights reserved.

## 2025-06-04 NOTE — ASSESSMENT & PLAN NOTE
Orders:    Hemoglobin A1C; Future    Lipid Panel with Direct LDL reflex; Future    TSH, 3rd generation with Free T4 reflex; Future    Vitamin D 25 hydroxy; Future

## 2025-06-04 NOTE — ASSESSMENT & PLAN NOTE
Patient has Based upon the Diagnostic and Statistical Manual of Mental Health Disorders (DSM-5 guide for mental health disorders)    Patient has positive findings as listed on the Adult ADHD-RS-IV with Adult Prompts Document.    The patient has completed the controlled substance paperwork.   This patient has a Toxicology test scheduled for his/her appointment.     This patient was educated on side effects, risks of taking this type of medication which include abuse, misuse, dependence, addiction and tolerance.    All questions were answered including different dosing levels, increasing and decreasing of of this medication.  We discussed their continued open discussions with the PCP in order to make sure that they are on the correct dose.     ???The risk of opioid and controlled substance addiction as well as the proper prescribed use of opioids and controlled substances been discussed.  This includes instruction to lock away and secure opioids and controlled substances from the reach of children and pets and not to share, sell, or distribute prescribed controlled substances to anyone else as such behavior will result in discontinuation of opiold therapy and controlled substances.  ??The patient has been advised that discontinuation of alcohol use is a requirement of initiation and continuation of opioid therapy and controlled substance therapy.  ???The risks of dizziness, drowsiness and motor impairment been discussed with the patient with respect to opioid and controlled substance use, and the patlent been advised not to drive or operate any heavy machinery or engage in any safety sensitive tasks while using opioids or controlled substances.  All questions were answered including different dosing levels, increasing and decreasing of of this medication.  We reviewed the potential side effects of the medications including, but are not limited to, constipation, diarrhea, nausea/upset stomach, drowsiness, fatigue,  dizziness, urinary retention, visual changes, insomnia, headaches, nightmares, slowed breathing while sleeping, UTI issues, impaired judgment, addiction issues and the risk of fatal overdose if not taken as prescribed.   We discussed the importance of notifying the office/provider immediately of any side effects.   We discussed the importance of immediate notification if there are any feelings of suicidality thoughts or behaviors   We discussed the importance of contacting the office if he has any tachycardia or fainting situations.     The patient was warned against driving while taking sedation medications.    We discussed that sharing medications is a felony.   We discussed other side effects such as QT prolongation, risks of Myocardial Infarction (heart attack), stroke and sudden death.   We discussed immediate notification if there is any seizure-like activity.    We discussed issues with angioedema as an anaphylactic reactions.      I have personally reviewed the [place] Drug Monitoring Program (PDMP) website prior to prescribing this medication.  The patient understands and agrees to use these medications only as prescribed. At this point in time, the patient is showing no signs of addiction, abuse, diversion or suicidal ideation.  All the patient's questions were answered to their satisfaction.  [place] Prescription Drug Monitoring Program report was reviewed and was appropriate     I have personally reviewed the [place] Drug Monitoring Program (PDMP) website prior to prescribing this medication and they have been found to be appropriate for this medication.  The patient understands and agrees to use these medications only as prescribed.   At this point in time, the patient is showing no signs of addiction, abuse, diversion or suicidal ideation.  The patient's use has been found to be appropriate without any concerns for misuse by the patient.   The patient's current conditions require  continued scheduled medication use at this time.   Future review for continued appropriate medication use and misuse will continue.    All the patient's questions were answered to their satisfaction.  [place] Prescription Drug Monitoring Program report was reviewed and was appropriate     ???The patient been advised to the rationale and requirement of using the PDMP, UDS, Pill Counts, and pain assessment tools to initiate and continue opioid and controlled substance therapy.  The patient has also been advised as to the appropriate way to call in for refill of medication, allowing for a five to seven day time frame for medications refills.    The patient understands and agrees to use these medications only as prescribed. At this point in time, the patient is showing no signs of addiction, abuse, diversion or suicidal ideation.  All the patient's questions were answered to their satisfaction.  Orders:    Drug Screen Routine w /Conf and Adulteration, urine.; Future    amphetamine-dextroamphetamine (ADDERALL, 5MG,) 5 MG tablet; Take 1 tablet (5 mg total) by mouth 2 (two) times a day Max Daily Amount: 10 mg

## 2025-06-04 NOTE — PROGRESS NOTES
Adult Annual Physical  Name: Karina Salazar      : 2005      MRN: [id number]  Encounter Provider: RITESH Benson  Encounter Date: 2025   Encounter department: [facility]    :  Assessment & Plan  Encounter for immunization         Screening for malignant neoplasm of cervix    Orders:    Ambulatory Referral to Obstetrics / Gynecology; Future    Annual physical exam    Orders:    CBC and differential; Future    Comprehensive metabolic panel; Future    Impaired fasting glucose    Orders:    Hemoglobin A1C; Future    On long term drug therapy    Orders:    Hemoglobin A1C; Future    Lipid Panel with Direct LDL reflex; Future    TSH, 3rd generation with Free T4 reflex; Future    Vitamin D 25 hydroxy; Future    Pure hypercholesterolemia    Orders:    Lipid Panel with Direct LDL reflex; Future    Abnormal results of thyroid function studies    Orders:    TSH, 3rd generation with Free T4 reflex; Future    Vitamin D deficiency    Orders:    Vitamin D 25 hydroxy; Future    Vitamin B12 deficiency (dietary) anemia      Orders:    Vitamin B12; Future    Encounter for therapeutic drug level monitoring    Orders:    Drug Screen Routine w /Conf and Adulteration, urine.; Future    Adult ADHD      Patient has Based upon the Diagnostic and Statistical Manual of Mental Health Disorders (DSM-5 guide for mental health disorders)    Patient has positive findings as listed on the Adult ADHD-RS-IV with Adult Prompts Document.    The patient has completed the controlled substance paperwork.   This patient has a Toxicology test scheduled for his/her appointment.     This patient was educated on side effects, risks of taking this type of medication which include abuse, misuse, dependence, addiction and tolerance.    All questions were answered including different dosing levels, increasing and decreasing of of this medication.  We discussed their continued open discussions with the PCP in order  to make sure that they are on the correct dose.     ???The risk of opioid and controlled substance addiction as well as the proper prescribed use of opioids and controlled substances been discussed.  This includes instruction to lock away and secure opioids and controlled substances from the reach of children and pets and not to share, sell, or distribute prescribed controlled substances to anyone else as such behavior will result in discontinuation of opiold therapy and controlled substances.  ??The patient has been advised that discontinuation of alcohol use is a requirement of initiation and continuation of opioid therapy and controlled substance therapy.  ???The risks of dizziness, drowsiness and motor impairment been discussed with the patient with respect to opioid and controlled substance use, and the patlent been advised not to drive or operate any heavy machinery or engage in any safety sensitive tasks while using opioids or controlled substances.  All questions were answered including different dosing levels, increasing and decreasing of of this medication.  We reviewed the potential side effects of the medications including, but are not limited to, constipation, diarrhea, nausea/upset stomach, drowsiness, fatigue, dizziness, urinary retention, visual changes, insomnia, headaches, nightmares, slowed breathing while sleeping, UTI issues, impaired judgment, addiction issues and the risk of fatal overdose if not taken as prescribed.   We discussed the importance of notifying the office/provider immediately of any side effects.   We discussed the importance of immediate notification if there are any feelings of suicidality thoughts or behaviors   We discussed the importance of contacting the office if he has any tachycardia or fainting situations.     The patient was warned against driving while taking sedation medications.    We discussed that sharing medications is a felony.   We discussed other side effects  such as QT prolongation, risks of Myocardial Infarction (heart attack), stroke and sudden death.   We discussed immediate notification if there is any seizure-like activity.    We discussed issues with angioedema as an anaphylactic reactions.      I have personally reviewed the [place] Drug Monitoring Program (PDMP) website prior to prescribing this medication.  The patient understands and agrees to use these medications only as prescribed. At this point in time, the patient is showing no signs of addiction, abuse, diversion or suicidal ideation.  All the patient's questions were answered to their satisfaction.  [place] Prescription Drug Monitoring Program report was reviewed and was appropriate     I have personally reviewed the [place] Drug Monitoring Program (PDMP) website prior to prescribing this medication and they have been found to be appropriate for this medication.  The patient understands and agrees to use these medications only as prescribed.   At this point in time, the patient is showing no signs of addiction, abuse, diversion or suicidal ideation.  The patient's use has been found to be appropriate without any concerns for misuse by the patient.   The patient's current conditions require continued scheduled medication use at this time.   Future review for continued appropriate medication use and misuse will continue.    All the patient's questions were answered to their satisfaction.  [place] Prescription Drug Monitoring Program report was reviewed and was appropriate     ???The patient been advised to the rationale and requirement of using the PDMP, UDS, Pill Counts, and pain assessment tools to initiate and continue opioid and controlled substance therapy.  The patient has also been advised as to the appropriate way to call in for refill of medication, allowing for a five to seven day time frame for medications refills.    The patient understands and agrees to use these  medications only as prescribed. At this point in time, the patient is showing no signs of addiction, abuse, diversion or suicidal ideation.  All the patient's questions were answered to their satisfaction.  Orders:    Drug Screen Routine w /Conf and Adulteration, urine.; Future    amphetamine-dextroamphetamine (ADDERALL, 5MG,) 5 MG tablet; Take 1 tablet (5 mg total) by mouth 2 (two) times a day Max Daily Amount: 10 mg          Preventive Screenings:    - Cholesterol Screening: screening not indicated and has hyperlipidemia   - Hepatitis C screening: screening up-to-date   - HIV screening: screening up-to-date   - Lung cancer screening: screening not indicated     Counseling/Anticipatory Guidance:  - Alcohol: discussed moderation in alcohol intake and recommendations for healthy alcohol use.   - Drug use: discussed harms of illicit drug use and how it can negatively impact mental/physical health.   - Tobacco use: discussed harms of tobacco use and management options for quitting.   - Dental health: discussed importance of regular tooth brushing, flossing, and dental visits.   - Sexual health: discussed sexually transmitted diseases, partner selection, use of condoms, avoidance of unintended pregnancy, and contraceptive alternatives.   - Diet: discussed recommendations for a healthy/well-balanced diet.   - Exercise: the importance of regular exercise/physical activity was discussed. Recommend exercise 3-5 times per week for at least 30 minutes.   - Injury prevention: discussed safety/seat belts, safety helmets, smoke detectors, carbon monoxide detectors, and smoking near bedding or upholstery.          History of Present Illness     Adult Annual Physical:  Patient presents for annual physical.     Diet and Physical Activity:  - Diet/Nutrition: no special diet.  - Exercise: walking.    Depression Screening:    - PHQ-9 Score: 0    General Health:  - Sleep: 4-6 hours of sleep on average.  - Hearing: normal hearing  "bilateral ears.  - Vision: no vision problems.  - Dental: regular dental visits.    /GYN Health:  - Follows with GYN: no.   - History of STDs: no    Advanced Care Planning:  - Has an advanced directive?: no    - Has a durable medical POA?: no    - ACP document given to patient?: no      Review of Systems   Constitutional:  Negative for activity change, chills, fatigue and fever.   HENT:  Negative for rhinorrhea and sore throat.    Eyes:  Negative for pain.   Respiratory:  Negative for cough and shortness of breath.    Cardiovascular:  Negative for chest pain, palpitations and leg swelling.   Gastrointestinal:  Negative for abdominal pain, constipation, diarrhea, nausea and vomiting.   Genitourinary:  Negative for difficulty urinating, flank pain, frequency and urgency.   Musculoskeletal:  Negative for gait problem, joint swelling and myalgias.   Skin:  Negative for color change.   Neurological:  Negative for dizziness, weakness, light-headedness and headaches.   Psychiatric/Behavioral:  Negative for sleep disturbance. The patient is not nervous/anxious.    All other systems reviewed and are negative.    Medications Ordered Prior to Encounter[1]     Objective   /60 (BP Location: Left arm, Patient Position: Sitting, Cuff Size: Standard)   Pulse 98   Temp (!) 75 °F (23.9 °C) (Tympanic)   Resp 14   Ht 5' 4.5\" (1.638 m)   Wt 57.2 kg (126 lb)   BMI 21.29 kg/m²     Physical Exam  Vitals and nursing note reviewed.   Constitutional:       General: She is awake. She is not in acute distress.     Appearance: Normal appearance. She is well-developed.   HENT:      Head: Normocephalic and atraumatic.      Nose: Nose normal.      Mouth/Throat:      Mouth: Mucous membranes are moist.     Eyes:      Conjunctiva/sclera: Conjunctivae normal.       Cardiovascular:      Rate and Rhythm: Normal rate and regular rhythm.      Pulses: Normal pulses.      Heart sounds: Normal heart sounds. No murmur heard.  Pulmonary:      " Effort: Pulmonary effort is normal. No respiratory distress.      Breath sounds: Normal breath sounds.   Abdominal:      General: Bowel sounds are normal.      Palpations: Abdomen is soft.      Tenderness: There is no abdominal tenderness.     Musculoskeletal:      Cervical back: Neck supple.      Right lower leg: No edema.      Left lower leg: No edema.     Skin:     General: Skin is warm and dry.     Neurological:      Mental Status: She is alert and oriented to person, place, and time.     Psychiatric:         Attention and Perception: Attention normal.         Mood and Affect: Mood normal.         Speech: Speech normal.         Behavior: Behavior normal. Behavior is cooperative.         Thought Content: Thought content normal.         Cognition and Memory: Cognition normal.         Judgment: Judgment normal.       Administrative Statements   I have spent a total time of 40 minutes in caring for this patient on the day of the visit/encounter including Diagnostic results, Prognosis, Risks and benefits of tx options, Instructions for management, Patient and family education, Importance of tx compliance, Risk factor reductions, Impressions, Counseling / Coordination of care, Documenting in the medical record, Reviewing/placing orders in the medical record (including tests, medications, and/or procedures), and Obtaining or reviewing history  .         [1]   Current Outpatient Medications on File Prior to Visit   Medication Sig Dispense Refill    amphetamine-dextroamphetamine (ADDERALL XR, 15MG,) 15 MG 24 hr capsule Take 1 capsule (15 mg total) by mouth every morning Max Daily Amount: 15 mg 30 capsule 0    [DISCONTINUED] amphetamine-dextroamphetamine (ADDERALL, 5MG,) 5 MG tablet Take 1 tablet (5 mg total) by mouth 2 (two) times a day Max Daily Amount: 10 mg 60 tablet 0     No current facility-administered medications on file prior to visit.

## 2025-06-09 ENCOUNTER — TELEPHONE (OUTPATIENT)
Age: 20
End: 2025-06-09

## 2025-06-09 NOTE — TELEPHONE ENCOUNTER
Referral rcd for obgyn.     2nd phone attempt- Called pt, left non-detailed vm with return ph# to call for assistance with scheduling.     1st attempt documented in referral, made on 6/5.

## 2025-07-04 PROBLEM — Z12.4 SCREENING FOR MALIGNANT NEOPLASM OF CERVIX: Status: RESOLVED | Noted: 2025-06-04 | Resolved: 2025-07-04

## 2025-07-12 DIAGNOSIS — F90.9 ADULT ADHD: Chronic | ICD-10-CM

## 2025-07-14 RX ORDER — DEXTROAMPHETAMINE SACCHARATE, AMPHETAMINE ASPARTATE MONOHYDRATE, DEXTROAMPHETAMINE SULFATE AND AMPHETAMINE SULFATE 3.75; 3.75; 3.75; 3.75 MG/1; MG/1; MG/1; MG/1
15 CAPSULE, EXTENDED RELEASE ORAL EVERY MORNING
Qty: 30 CAPSULE | Refills: 0 | Status: SHIPPED | OUTPATIENT
Start: 2025-07-14

## 2025-07-14 RX ORDER — DEXTROAMPHETAMINE SACCHARATE, AMPHETAMINE ASPARTATE, DEXTROAMPHETAMINE SULFATE AND AMPHETAMINE SULFATE 1.25; 1.25; 1.25; 1.25 MG/1; MG/1; MG/1; MG/1
5 TABLET ORAL 2 TIMES DAILY
Qty: 60 TABLET | Refills: 0 | Status: SHIPPED | OUTPATIENT
Start: 2025-07-14

## 2025-07-21 DIAGNOSIS — F90.9 ADULT ADHD: Chronic | ICD-10-CM

## 2025-07-21 RX ORDER — DEXTROAMPHETAMINE SACCHARATE, AMPHETAMINE ASPARTATE MONOHYDRATE, DEXTROAMPHETAMINE SULFATE AND AMPHETAMINE SULFATE 3.75; 3.75; 3.75; 3.75 MG/1; MG/1; MG/1; MG/1
15 CAPSULE, EXTENDED RELEASE ORAL EVERY MORNING
Qty: 30 CAPSULE | Refills: 0 | Status: CANCELLED | OUTPATIENT
Start: 2025-07-21